# Patient Record
Sex: FEMALE | Race: WHITE | Employment: UNEMPLOYED | URBAN - METROPOLITAN AREA
[De-identification: names, ages, dates, MRNs, and addresses within clinical notes are randomized per-mention and may not be internally consistent; named-entity substitution may affect disease eponyms.]

---

## 2017-05-02 RX ORDER — ZOLPIDEM TARTRATE 5 MG/1
TABLET ORAL
Qty: 30 TAB | Refills: 1 | OUTPATIENT
Start: 2017-05-02 | End: 2018-04-23 | Stop reason: SDUPTHER

## 2017-06-14 ENCOUNTER — TELEPHONE (OUTPATIENT)
Dept: INTERNAL MEDICINE CLINIC | Age: 52
End: 2017-06-14

## 2017-06-14 NOTE — TELEPHONE ENCOUNTER
Spoke with pt calling with c/o palpitations on exertion four times in the past 3 months. It is only with exertion on left side and then sometimes pain down arm. Sx stop when she stops whatever she is doing. Offered appt for today, and pt is not interested and is going out of town and not returning until [facility] and is having construction at her house as well. Only wants to see [facility]J. Appt made for eval with [facility]J on 6/21. Red flags reviewed to warrant ED visit. Asked her to please have labs done that were ordered at last visit 10/16. Pt verbalized complete understanding.

## 2017-06-21 ENCOUNTER — OFFICE VISIT (OUTPATIENT)
Dept: INTERNAL MEDICINE CLINIC | Age: 52
End: 2017-06-21

## 2017-06-21 VITALS
RESPIRATION RATE: 16 BRPM | HEIGHT: 63 IN | HEART RATE: 98 BPM | SYSTOLIC BLOOD PRESSURE: 102 MMHG | OXYGEN SATURATION: 97 % | DIASTOLIC BLOOD PRESSURE: 64 MMHG | BODY MASS INDEX: 21.26 KG/M2 | WEIGHT: 120 LBS | TEMPERATURE: 98.7 F

## 2017-06-21 DIAGNOSIS — R07.89 OTHER CHEST PAIN: ICD-10-CM

## 2017-06-21 DIAGNOSIS — M25.512 ACUTE PAIN OF LEFT SHOULDER: Primary | ICD-10-CM

## 2017-06-21 DIAGNOSIS — E78.2 MIXED HYPERLIPIDEMIA: ICD-10-CM

## 2017-06-21 DIAGNOSIS — E03.9 UNSPECIFIED HYPOTHYROIDISM: ICD-10-CM

## 2017-06-21 DIAGNOSIS — R00.2 PALPITATIONS: ICD-10-CM

## 2017-06-21 DIAGNOSIS — R31.9 HEMATURIA: ICD-10-CM

## 2017-06-21 RX ORDER — TRIAMCINOLONE ACETONIDE 1 MG/G
PASTE DENTAL 2 TIMES DAILY
Qty: 5 G | Refills: 1 | Status: SHIPPED | OUTPATIENT
Start: 2017-06-21 | End: 2019-07-31 | Stop reason: SDUPTHER

## 2017-06-21 RX ORDER — TRIAMCINOLONE ACETONIDE 1 MG/G
PASTE DENTAL 2 TIMES DAILY
COMMUNITY
End: 2017-06-21 | Stop reason: SDUPTHER

## 2017-06-21 NOTE — PROGRESS NOTES
Chief Complaint   Patient presents with    Palpitations     x 4 months     Mouth Lesions    Shoulder Pain     left intermittent      EKG findings:  Sinus  Rhythm   WITHIN NORMAL LIMITS  Reviewed by Dr. Cam Irving III.

## 2017-06-21 NOTE — MR AVS SNAPSHOT
Visit Information Date & Time Provider Department Dept. Phone Encounter #  
 6/21/2017  3:00 PM Jamila Titus MD [facility] Internal Medicine [phone] [id number] Upcoming Health Maintenance Date Due COLONOSCOPY 5/26/1982 INFLUENZA AGE 9 TO ADULT 8/1/2017 BREAST CANCER SCRN MAMMOGRAM 4/14/2018 PAP AKA CERVICAL CYTOLOGY 1/24/2019 DTaP/Tdap/Td series (2 - Td) 10/19/2026 Allergies as of 6/21/2017  Review Complete On: 6/21/2017 By: Sumeet Dorantes LPN No Known Allergies Current Immunizations  Reviewed on 12/4/2013 Name Date Influenza Vaccine (Quad) PF 10/19/2016 Influenza Vaccine Split 11/28/2012 Tdap 10/19/2016 Not reviewed this visit You Were Diagnosed With   
  
 Codes Comments Acute pain of left shoulder    -  Primary ICD-10-CM: M25.512 ICD-9-CM: 719.41 Palpitations     ICD-10-CM: R00.2 ICD-9-CM: 785.1 Hematuria     ICD-10-CM: R31.9 ICD-9-CM: 599.70 Unspecified hypothyroidism     ICD-10-CM: E03.9 ICD-9-CM: 244.9 Mixed hyperlipidemia     ICD-10-CM: E78.2 ICD-9-CM: 272.2 Other chest pain     ICD-10-CM: R07.89 ICD-9-CM: 786.59 Vitals BP Pulse Temp Resp Height(growth percentile) Weight(growth percentile) 102/64 98 98.7 °F (37.1 °C) (Oral) 16 5' 3\" (1.6 m) 120 lb (54.4 kg) SpO2 BMI OB Status Smoking Status 97% 21.26 kg/m2 Having regular periods Never Smoker Vitals History BMI and BSA Data Body Mass Index Body Surface Area  
 21.26 kg/m 2 1.56 m 2 Preferred Pharmacy Pharmacy Name Phone [id number] [address] [facility] [phone] Your Updated Medication List  
  
   
This list is accurate as of: 6/21/17  3:53 PM.  Always use your most recent med list.  
  
  
  
  
 ALPRAZolam 0.25 mg tablet Commonly known as:  XANAX  
TAKE 1 TABLET BY MOUTH TWICE A DAY AS NEEDED  
  
 ARMOUR THYROID 30 mg tablet Generic drug:  thyroid (Pork) 1 1/2 in AM & 1 @ night  
  
 citalopram 20 mg tablet Commonly known as:  CELEXA  
TAKE 1 & 1/2 TABLETS BY MOUTH DAILY. fluticasone 50 mcg/actuation nasal spray Commonly known as:  Saurabh Peña INSTILL 2 SPRAYS IN EACH NOSTRIL DAILY  
  
 metaxalone 800 mg tablet Commonly known as:  SKELAXIN Take 0.5 Tabs by mouth three (3) times daily as needed for Pain.  
  
 triamcinolone acetonide 0.1 % dental paste Commonly known as:  KENALOG  
by Dental route two (2) times a day. zolpidem 5 mg tablet Commonly known as:  AMBIEN  
TAKE 1/2 TO 1 TABLET BY MOUTH NIGHTLY AS NEEDED FOR SLEEP Prescriptions Sent to Pharmacy Refills  
 triamcinolone acetonide (KENALOG) 0.1 % dental paste 1 Sig: by Dental route two (2) times a day. Class: Normal  
 Pharmacy: [address] [place] [address] #: [phone] Route: Dental  
  
We Performed the Following AMB POC EKG ROUTINE W/ 12 LEADS, INTER & REP [53911 CPT(R)] UA/M W/RFLX CULTURE, ROUTINE [[id number] Custom] To-Do List   
 06/21/2017 ECHO:  ECHO TTE STRESS EXRCSE COMP W OR WO CONTR Introducing [facility]! Dear Arlette Prior: Thank you for requesting a Wengo account. Our records indicate that you already have an active Wengo account. You can access your account anytime at [email or web address] Did you know that you can access your hospital and ER discharge instructions at any time in Wengo? You can also review all of your test results from your hospital stay or ER visit. Additional Information If you have questions, please visit the Frequently Asked Questions section of the Wengo website at [email or web address] Remember, Wengo is NOT to be used for urgent needs. For medical emergencies, dial 911. Now available from your iPhone and Android! Please provide this summary of care documentation to your next provider. Your primary care clinician is listed as [phone] If you have any questions after today's visit, please call [phone].

## 2017-06-22 LAB
APPEARANCE UR: CLEAR
BACTERIA #/AREA URNS HPF: NORMAL /[HPF]
BILIRUB UR QL STRIP: NEGATIVE
CASTS URNS QL MICRO: NORMAL /LPF
COLOR UR: YELLOW
EPI CELLS #/AREA URNS HPF: NORMAL /HPF
GLUCOSE UR QL: NEGATIVE
HGB UR QL STRIP: ABNORMAL
KETONES UR QL STRIP: NEGATIVE
LEUKOCYTE ESTERASE UR QL STRIP: NEGATIVE
MICRO URNS: ABNORMAL
MUCOUS THREADS URNS QL MICRO: PRESENT
NITRITE UR QL STRIP: NEGATIVE
PH UR STRIP: 7 [PH] (ref 5–7.5)
PROT UR QL STRIP: NEGATIVE
RBC #/AREA URNS HPF: NORMAL /HPF
SP GR UR: 1.02 (ref 1–1.03)
URINALYSIS REFLEX, 377202: ABNORMAL
UROBILINOGEN UR STRIP-MCNC: 0.2 MG/DL (ref 0.2–1)
WBC #/AREA URNS HPF: NORMAL /HPF

## 2017-06-22 NOTE — PROGRESS NOTES
HPI:  Michael Min is a 47y.o. year old female who is here for several issues. Had recent labs with free T4 that was low but she had missed several doses of her meds. Recent labs by a DO were fine for thyroid. Also her urine showed red and white cells with no symptoms. Her cholesterol was up but her HDL was 91. Over the last few months, several episodes of left shoulder pain that goes to the neck with exertion. At times it is walking up a hill and at others with lunging at the dog. Rest resolves it after a few minutes. Some BURCH but no nausea or sweats. No PND or orthopnea. No change In bowels or bladder. No fever or chills. No reflux. Past Medical History:   Diagnosis Date    Allergic rhinitis     Cancer ([facility] [address]     skin    Hypothyroid     Unspecified hypothyroidism 12/6/2010       Past Surgical History:   Procedure Laterality Date    BIOPSY OF BREAST, NEEDLE CORE      times two    HX BREAST BIOPSY Left 2006    Excisional biopsy revealing ADH    HX PELVIC LAPAROSCOPY      HX WISDOM TEETH EXTRACTION      ORAL SURGERY PROCEDURE         Prior to Admission medications    Medication Sig Start Date End Date Taking? Authorizing Provider   triamcinolone acetonide (KENALOG) 0.1 % dental paste by Dental route two (2) times a day. 6/21/17  Yes Emerson Whittington III, MD   zolpidem (AMBIEN) 5 mg tablet TAKE 1/2 TO 1 TABLET BY MOUTH NIGHTLY AS NEEDED FOR SLEEP 5/2/17  Yes Emerson Whittington III, MD   citalopram (CELEXA) 20 mg tablet TAKE 1 & 1/2 TABLETS BY MOUTH DAILY.  10/19/16  Yes Giorgio King MD   ALPRAZolam Kourtney Bowen) 0.25 mg tablet TAKE 1 TABLET BY MOUTH TWICE A DAY AS NEEDED 10/19/16  Yes Giorgio King MD   ARMOUR THYROID 30 mg tablet 1 1/2 in AM & 1 @ night  1/17/16  Yes Historical Provider   fluticasone (FLONASE) 50 mcg/actuation nasal spray INSTILL 2 SPRAYS IN EACH NOSTRIL DAILY 2/25/15  Yes Giorgio King MD   metaxalone (SKELAXIN) 800 mg tablet Take 0.5 Tabs by mouth three (3) times daily as needed for Pain. 12/4/13  Yes Catarina Muir MD       Social History     Social History    Marital status:      Spouse name: N/A    Number of children: N/A    Years of education: N/A     Occupational History    Not on file. Social History Main Topics    Smoking status: Never Smoker    Smokeless tobacco: Never Used    Alcohol use 0.0 oz/week     2 - 4 Glasses of wine per week    Drug use: No    Sexual activity: Yes     Partners: Male     Other Topics Concern    Not on file     Social History Narrative      Family History   Problem Relation Age of Onset    Thyroid Disease Mother      hypothyroid    Heart Disease Mother     Dementia Mother     Diabetes Father      small cell lung cancer    No Known Problems Brother     Heart Disease Paternal Grandmother          ROS  Per HPI    Visit Vitals    /64    Pulse 98    Temp 98.7 °F (37.1 °C) (Oral)    Resp 16    Ht 5' 3\" (1.6 m)    Wt 120 lb (54.4 kg)    SpO2 97%    BMI 21.26 kg/m2         Physical Exam   Physical Examination: General appearance - alert, well appearing, and in no distress  Mouth - mucous membranes moist, pharynx normal without lesions  Neck - supple, no significant adenopathy  Lymphatics - no palpable lymphadenopathy, no hepatosplenomegaly  Chest - clear to auscultation, no wheezes, rales or rhonchi, symmetric air entry  Heart - normal rate, regular rhythm, normal S1, S2, no murmurs, rubs, clicks or gallops  Abdomen - soft, nontender, nondistended, no masses or organomegaly  Neurological - alert, oriented, normal speech, no focal findings or movement disorder noted  Musculoskeletal - no joint tenderness, deformity or swelling  Extremities - peripheral pulses normal, no pedal edema, no clubbing or cyanosis    EKG: normal EKG, normal sinus rhythm, unchanged from previous tracings    Catarina Muir MD      Assessment/Plan:  Ha Carter was seen today for palpitations, mouth lesions and shoulder pain.     Diagnoses and all orders for this visit:    Acute pain of left shoulder - concerning for exertional angina and positive family history as well as menopause and high cholesterol - despite high HDL. Will get stress test and consider coronary calcium score if negative and statins.   -     AMB POC EKG ROUTINE W/ 12 LEADS, INTER & REP    Palpitations  -     AMB POC EKG ROUTINE W/ 12 LEADS, INTER & REP  -     ECHO TTE STRESS EXRCSE COMP W OR WO CONTR; Future    Hematuria - negative culture/. If it is repeated, will get urology to see for cysto and possible interstitial cystitis  -     UA/M W/RFLX CULTURE, ROUTINE    Unspecified hypothyroidism - despite the low free t4, will continue same meds and repeat levels in 3-6 months due to missed doses. Mixed hyperlipidemia - diet now. Other chest pain  -     ECHO TTE STRESS EXRCSE COMP W OR WO CONTR; Future    Other orders  -     triamcinolone acetonide (KENALOG) 0.1 % dental paste; by Dental route two (2) times a day. Follow-up Disposition: after stress test.        Advised her to call back or return to office if symptoms worsen/change/persist.  Discussed expected course/resolution/complications of diagnosis in detail with patient. Medication risks/benefits/costs/interactions/alternatives discussed with patient. She was given an after visit summary which includes diagnoses, current medications, & vitals. She expressed understanding with the diagnosis and plan.

## 2017-07-14 ENCOUNTER — CLINICAL SUPPORT (OUTPATIENT)
Dept: CARDIOLOGY CLINIC | Age: 52
End: 2017-07-14

## 2017-07-14 DIAGNOSIS — R07.89 OTHER CHEST PAIN: ICD-10-CM

## 2017-07-14 DIAGNOSIS — R00.2 PALPITATIONS: ICD-10-CM

## 2017-09-27 ENCOUNTER — OFFICE VISIT (OUTPATIENT)
Dept: SURGERY | Age: 52
End: 2017-09-27

## 2017-09-27 VITALS
BODY MASS INDEX: 22.15 KG/M2 | WEIGHT: 125 LBS | HEART RATE: 77 BPM | DIASTOLIC BLOOD PRESSURE: 75 MMHG | SYSTOLIC BLOOD PRESSURE: 119 MMHG | HEIGHT: 63 IN

## 2017-09-27 DIAGNOSIS — N60.42 DUCT ECTASIA OF BREAST, LEFT: Primary | ICD-10-CM

## 2017-09-27 DIAGNOSIS — Z91.89 AT RISK FOR BREAST CANCER: ICD-10-CM

## 2017-09-27 NOTE — PROGRESS NOTES
HISTORY OF PRESENT ILLNESS  Hawa Dill is a 47 y.o. female. HPI  ESTABLISHED patient here for LEFT breast pain and cord feeling to her LEFT breast.  3 weeks ago, her LEFT breast started hurting. The pain is below her nipple and lower breast. It is a 3/10. The breast was pinched or mashed so she thought the pain was from that. It hasn't gone away and she feels a cord like lump at the area of the pain. Denies nipple discharge/retraction. No skin changes. History of excisional biopsy for LEFT breast ADH in 2009.     Last mammogram done 4/2016, BIRADS 1  Breast MRI, 4/2016, BIRADS 2 to LEFT breast,  BIRADS 1 to RIGHT breast    Past Medical History:   Diagnosis Date    Allergic rhinitis     Cancer ([facility] [address]     skin    Hypothyroid     Unspecified hypothyroidism 12/6/2010       Past Surgical History:   Procedure Laterality Date    BIOPSY OF BREAST, NEEDLE CORE      times two    HX BREAST BIOPSY Left 2006    Excisional biopsy revealing ADH    HX PELVIC LAPAROSCOPY      HX WISDOM TEETH EXTRACTION      ORAL SURGERY PROCEDURE         Social History     Social History    Marital status:      Spouse name: N/A    Number of children: N/A    Years of education: N/A     Occupational History    Not on file. Social History Main Topics    Smoking status: Never Smoker    Smokeless tobacco: Never Used    Alcohol use 0.0 oz/week     2 - 4 Glasses of wine per week    Drug use: No    Sexual activity: Yes     Partners: Male     Other Topics Concern    Not on file     Social History Narrative       Current Outpatient Prescriptions on File Prior to Visit   Medication Sig Dispense Refill    zolpidem (AMBIEN) 5 mg tablet TAKE 1/2 TO 1 TABLET BY MOUTH NIGHTLY AS NEEDED FOR SLEEP 30 Tab 1    citalopram (CELEXA) 20 mg tablet TAKE 1 & 1/2 TABLETS BY MOUTH DAILY.  45 Tab 11    ARMOUR THYROID 30 mg tablet 1 1/2 in AM & 1 @ night   0    fluticasone (FLONASE) 50 mcg/actuation nasal spray INSTILL 2 SPRAYS IN EACH NOSTRIL DAILY 3 Bottle 3    triamcinolone acetonide (KENALOG) 0.1 % dental paste by Dental route two (2) times a day. 5 g 1    ALPRAZolam (XANAX) 0.25 mg tablet TAKE 1 TABLET BY MOUTH TWICE A DAY AS NEEDED 60 Tab 1    metaxalone (SKELAXIN) 800 mg tablet Take 0.5 Tabs by mouth three (3) times daily as needed for Pain. 40 Tab 0     No current facility-administered medications on file prior to visit. No Known Allergies    OB History     No data available          ROS  Constitutional: Negative    HENT: Negative. Eyes: Negative. Respiratory: Negative. Cardiovascular: Negative. Gastrointestinal: Negative. Genitourinary: Negative. Musculoskeletal: Negative. Skin: Negative. Neurological: Negative. Endo/Heme/Allergies: Negative. Psychiatric/Behavioral: Negative. Physical Exam   Cardiovascular: Normal rate and normal heart sounds. Pulmonary/Chest: Breath sounds normal. Right breast exhibits no inverted nipple, no mass, no nipple discharge, no skin change and no tenderness. Left breast exhibits skin change and tenderness. Left breast exhibits no inverted nipple, no mass and no nipple discharge. Breasts are symmetrical.       Lymphadenopathy:        Right cervical: No superficial cervical, no deep cervical and no posterior cervical adenopathy present. Left cervical: No superficial cervical, no deep cervical and no posterior cervical adenopathy present. Right axillary: No pectoral and no lateral adenopathy present. Left axillary: No pectoral and no lateral adenopathy present. BREAST ULTRASOUND  Indication: Left  breast mass 6:00  Technique: The area was scanned using a high-frequency linear-array near-field transducer  Findings: No abnormal mass, lesion, or shadowing noted. No cysts  Impression: Normal breast tissue with duct ectasia   Disposition: No worrisome finding on ultrasound    ASSESSMENT and PLAN    ICD-10-CM ICD-9-CM    1.  Duct ectasia of breast, left N60.42 610.4 MRI BREAST BI W WO CONT   2. At risk for breast cancer Z91.89 V49.89      Pt presents with LEFT breast pain from trauma x3 weeks, likely related to hematoma. Last year's imaging looks good. Unremarkable exam and US today. Will order annual BL MRI and mammogram. This plan was reviewed with the patient and patient agrees. All questions were answered.     Written by Erendira Abad, as dictated by Dr. Celestine Shannon MD.

## 2017-09-27 NOTE — COMMUNICATION BODY
HISTORY OF PRESENT ILLNESS  Sandra Lantigua is a 47 y.o. female. HPI  ESTABLISHED patient here for LEFT breast pain and cord feeling to her LEFT breast.  3 weeks ago, her LEFT breast started hurting. The pain is below her nipple and lower breast. It is a 3/10. The breast was pinched or mashed so she thought the pain was from that. It hasn't gone away and she feels a cord like lump at the area of the pain. Denies nipple discharge/retraction. No skin changes. History of excisional biopsy for LEFT breast ADH in 2009.     Last mammogram done 4/2016, BIRADS 1  Breast MRI, 4/2016, BIRADS 2 to LEFT breast,  BIRADS 1 to RIGHT breast    Past Medical History:   Diagnosis Date    Allergic rhinitis     Cancer ([facility] [address]     skin    Hypothyroid     Unspecified hypothyroidism 12/6/2010       Past Surgical History:   Procedure Laterality Date    BIOPSY OF BREAST, NEEDLE CORE      times two    HX BREAST BIOPSY Left 2006    Excisional biopsy revealing ADH    HX PELVIC LAPAROSCOPY      HX WISDOM TEETH EXTRACTION      ORAL SURGERY PROCEDURE         Social History     Social History    Marital status:      Spouse name: N/A    Number of children: N/A    Years of education: N/A     Occupational History    Not on file. Social History Main Topics    Smoking status: Never Smoker    Smokeless tobacco: Never Used    Alcohol use 0.0 oz/week     2 - 4 Glasses of wine per week    Drug use: No    Sexual activity: Yes     Partners: Male     Other Topics Concern    Not on file     Social History Narrative       Current Outpatient Prescriptions on File Prior to Visit   Medication Sig Dispense Refill    zolpidem (AMBIEN) 5 mg tablet TAKE 1/2 TO 1 TABLET BY MOUTH NIGHTLY AS NEEDED FOR SLEEP 30 Tab 1    citalopram (CELEXA) 20 mg tablet TAKE 1 & 1/2 TABLETS BY MOUTH DAILY.  45 Tab 11    ARMOUR THYROID 30 mg tablet 1 1/2 in AM & 1 @ night   0    fluticasone (FLONASE) 50 mcg/actuation nasal spray INSTILL 2 SPRAYS IN EACH NOSTRIL DAILY 3 Bottle 3    triamcinolone acetonide (KENALOG) 0.1 % dental paste by Dental route two (2) times a day. 5 g 1    ALPRAZolam (XANAX) 0.25 mg tablet TAKE 1 TABLET BY MOUTH TWICE A DAY AS NEEDED 60 Tab 1    metaxalone (SKELAXIN) 800 mg tablet Take 0.5 Tabs by mouth three (3) times daily as needed for Pain. 40 Tab 0     No current facility-administered medications on file prior to visit. No Known Allergies    OB History     No data available          ROS  Constitutional: Negative    HENT: Negative. Eyes: Negative. Respiratory: Negative. Cardiovascular: Negative. Gastrointestinal: Negative. Genitourinary: Negative. Musculoskeletal: Negative. Skin: Negative. Neurological: Negative. Endo/Heme/Allergies: Negative. Psychiatric/Behavioral: Negative. Physical Exam   Cardiovascular: Normal rate and normal heart sounds. Pulmonary/Chest: Breath sounds normal. Right breast exhibits no inverted nipple, no mass, no nipple discharge, no skin change and no tenderness. Left breast exhibits skin change and tenderness. Left breast exhibits no inverted nipple, no mass and no nipple discharge. Breasts are symmetrical.       Lymphadenopathy:        Right cervical: No superficial cervical, no deep cervical and no posterior cervical adenopathy present. Left cervical: No superficial cervical, no deep cervical and no posterior cervical adenopathy present. Right axillary: No pectoral and no lateral adenopathy present. Left axillary: No pectoral and no lateral adenopathy present. BREAST ULTRASOUND  Indication: Left  breast mass 6:00  Technique: The area was scanned using a high-frequency linear-array near-field transducer  Findings: No abnormal mass, lesion, or shadowing noted. No cysts  Impression: Normal breast tissue with duct ectasia   Disposition: No worrisome finding on ultrasound    ASSESSMENT and PLAN    ICD-10-CM ICD-9-CM    1.  Duct ectasia of breast, left N60.42 610.4 MRI BREAST BI W WO CONT   2. At risk for breast cancer Z91.89 V49.89      Pt presents with LEFT breast pain from trauma x3 weeks, likely related to hematoma. Last year's imaging looks good. Unremarkable exam and US today. Will order annual BL MRI and mammogram. This plan was reviewed with the patient and patient agrees. All questions were answered.     Written by Jacqueline Mcgrath, as dictated by Dr. Ericka Mayer MD.

## 2017-09-27 NOTE — MR AVS SNAPSHOT
Visit Information Date & Time Provider Department Dept. Phone Encounter #  
 9/27/2017  0:59 PM Hazel Dang MD [address] at [facility] [phone] Upcoming Health Maintenance Date Due COLONOSCOPY 5/26/1982 INFLUENZA AGE 9 TO ADULT 8/1/2017 BREAST CANCER SCRN MAMMOGRAM 4/14/2018 PAP AKA CERVICAL CYTOLOGY 1/24/2019 DTaP/Tdap/Td series (2 - Td) 10/19/2026 Allergies as of 9/27/2017  Review Complete On: 9/27/2017 By: Laz Calderon RN No Known Allergies Current Immunizations  Reviewed on 12/4/2013 Name Date Influenza Vaccine (Quad) PF 10/19/2016 Influenza Vaccine Split 11/28/2012 Tdap 10/19/2016 Not reviewed this visit You Were Diagnosed With   
  
 Codes Comments Duct ectasia of breast, left    -  Primary ICD-10-CM: N60.42 
ICD-9-CM: 610.4 Vitals BP Pulse Height(growth percentile) Weight(growth percentile) BMI  
 119/75 77 5' 3\" (1.6 m) 125 lb (56.7 kg) 22.14 kg/m2 OB Status Smoking Status Menopause Never Smoker Vitals History BMI and BSA Data Body Mass Index Body Surface Area  
 22.14 kg/m 2 1.59 m 2 Preferred Pharmacy Pharmacy Name Phone [id number] [phone] Your Updated Medication List  
  
   
This list is accurate as of: 9/27/17  3:12 PM.  Always use your most recent med list.  
  
  
  
  
 ALPRAZolam 0.25 mg tablet Commonly known as:  XANAX  
TAKE 1 TABLET BY MOUTH TWICE A DAY AS NEEDED  
  
 ARMOUR THYROID 30 mg tablet Generic drug:  thyroid (Pork) 1 1/2 in AM & 1 @ night  
  
 citalopram 20 mg tablet Commonly known as:  CELEXA  
TAKE 1 & 1/2 TABLETS BY MOUTH DAILY. fluticasone 50 mcg/actuation nasal spray Commonly known as:  Eveline Knack INSTILL 2 SPRAYS IN EACH NOSTRIL DAILY  
  
 metaxalone 800 mg tablet Commonly known as:  SKELAXIN  
 Take 0.5 Tabs by mouth three (3) times daily as needed for Pain.  
  
 triamcinolone acetonide 0.1 % dental paste Commonly known as:  KENALOG  
by Dental route two (2) times a day. zolpidem 5 mg tablet Commonly known as:  AMBIEN  
TAKE 1/2 TO 1 TABLET BY MOUTH NIGHTLY AS NEEDED FOR SLEEP Patient Instructions Breast Lumps (Noncancerous): Care Instructions Your Care Instructions Breast lumps or changes are a common health worry for most women. Women may have many kinds of breast lumps and other breast changes throughout their lives, including ones that occur with menstrual periods, pregnancy, and aging. Most breast lumps are harmless and are not cancer. Many womens breasts feel lumpy and tender before their menstrual periods. Women also may have lumps when they are breastfeeding. Breast lumps may go away after menopause. Common noncancerous breast lumps include: · Cysts, or sacs filled with fluid. · Skin cysts in the breast area. · Fatty lumps, which may be firm. These may or may not cause pain. · Fibroadenomas, growths that are round and firm with smooth edges. · Abscesses, which are pockets of infection within the breast. 
Follow-up care is a key part of your treatment and safety. Be sure to make and go to all appointments, and call your doctor if you are having problems. It's also a good idea to know your test results and keep a list of the medicines you take. How can you care for yourself at home? · Take an over-the-counter pain medicine, such as acetaminophen (Tylenol), ibuprofen (Advil, Motrin), or naproxen (Aleve). Read and follow all instructions on the label. · Do not take two or more pain medicines at the same time unless the doctor told you to. Many pain medicines have acetaminophen, which is Tylenol. Too much acetaminophen (Tylenol) can be harmful. · If you are pregnant, do not take any medicine other than acetaminophen unless your doctor has told you to. · Wear a supportive bra, such as a sports bra or jog bra. · You may want to limit caffeine. Some women say that cutting back on caffeine reduces their breast tenderness. · A diet very low in fat (about 15% of daily diet) may reduce breast tenderness. Talk to your doctor about whether you should try a very low-fat diet. · A diet low in salt (sodium) also may reduce breast tenderness. When should you call for help? Watch closely for changes in your health, and be sure to contact your doctor if you: 
· Have a fever. · Have swelling, redness, or pain. · Have a new breast lump that does not go away with your menstrual cycle. · Notice that a breast lump has changed. Where can you learn more? Go to http://suzanne-john.info/. Enter 95 [id number] in the search box to learn more about \"Breast Lumps (Noncancerous): Care Instructions. \" Current as of: October 13, 2016 Content Version: 11.3 © 4373-2166 [employer] Care instructions adapted under license by [employer] (which disclaims liability or warranty for this information). If you have questions about a medical condition or this instruction, always ask your healthcare professional. [place] [address] any warranty or liability for your use of this information. Breast Lumps (Noncancerous): Care Instructions Your Care Instructions Breast lumps or changes are a common health worry for most women. Women may have many kinds of breast lumps and other breast changes throughout their lives, including ones that occur with menstrual periods, pregnancy, and aging. Most breast lumps are harmless and are not cancer. Many womens breasts feel lumpy and tender before their menstrual periods. Women also may have lumps when they are breastfeeding. Breast lumps may go away after menopause. Common noncancerous breast lumps include: · Cysts, or sacs filled with fluid. · Skin cysts in the breast area. · Fatty lumps, which may be firm. These may or may not cause pain. · Fibroadenomas, growths that are round and firm with smooth edges. · Abscesses, which are pockets of infection within the breast. 
Follow-up care is a key part of your treatment and safety. Be sure to make and go to all appointments, and call your doctor if you are having problems. It's also a good idea to know your test results and keep a list of the medicines you take. How can you care for yourself at home? · Take an over-the-counter pain medicine, such as acetaminophen (Tylenol), ibuprofen (Advil, Motrin), or naproxen (Aleve). Read and follow all instructions on the label. · Do not take two or more pain medicines at the same time unless the doctor told you to. Many pain medicines have acetaminophen, which is Tylenol. Too much acetaminophen (Tylenol) can be harmful. · If you are pregnant, do not take any medicine other than acetaminophen unless your doctor has told you to. · Wear a supportive bra, such as a sports bra or jog bra. · You may want to limit caffeine. Some women say that cutting back on caffeine reduces their breast tenderness. · A diet very low in fat (about 15% of daily diet) may reduce breast tenderness. Talk to your doctor about whether you should try a very low-fat diet. · A diet low in salt (sodium) also may reduce breast tenderness. When should you call for help? Watch closely for changes in your health, and be sure to contact your doctor if you: 
· Have a fever. · Have swelling, redness, or pain. · Have a new breast lump that does not go away with your menstrual cycle. · Notice that a breast lump has changed. Where can you learn more? Go to http://suzanne-john.info/. Enter 95 [id number] in the search box to learn more about \"Breast Lumps (Noncancerous): Care Instructions. \" Current as of: October 13, 2016 Content Version: 11.3 © 0857-1986 [employer].  Care instructions adapted under license by [address] (which disclaims liability or warranty for this information). If you have questions about a medical condition or this instruction, always ask your healthcare professional. [address] any warranty or liability for your use of this information. Introducing [facility]! Dear Scott Polanco: Thank you for requesting a DancingAnchovy account. Our records indicate that you already have an active DancingAnchovy account. You can access your account anytime at [email or web address] Did you know that you can access your hospital and ER discharge instructions at any time in DancingAnchovy? You can also review all of your test results from your hospital stay or ER visit. Additional Information If you have questions, please visit the Frequently Asked Questions section of the DancingAnchovy website at [email or web address] Remember, DancingAnchovy is NOT to be used for urgent needs. For medical emergencies, dial 911. Now available from your iPhone and Android! Please provide this summary of care documentation to your next provider. Your primary care clinician is listed as Shlomo [phone] If you have any questions after today's visit, please call [phone].

## 2017-09-27 NOTE — PATIENT INSTRUCTIONS
Breast Lumps (Noncancerous): Care Instructions  Your Care Instructions  Breast lumps or changes are a common health worry for most women. Women may have many kinds of breast lumps and other breast changes throughout their lives, including ones that occur with menstrual periods, pregnancy, and aging. Most breast lumps are harmless and are not cancer. Many womens breasts feel lumpy and tender before their menstrual periods. Women also may have lumps when they are breastfeeding. Breast lumps may go away after menopause. Common noncancerous breast lumps include:  · Cysts, or sacs filled with fluid. · Skin cysts in the breast area. · Fatty lumps, which may be firm. These may or may not cause pain. · Fibroadenomas, growths that are round and firm with smooth edges. · Abscesses, which are pockets of infection within the breast.  Follow-up care is a key part of your treatment and safety. Be sure to make and go to all appointments, and call your doctor if you are having problems. It's also a good idea to know your test results and keep a list of the medicines you take. How can you care for yourself at home? · Take an over-the-counter pain medicine, such as acetaminophen (Tylenol), ibuprofen (Advil, Motrin), or naproxen (Aleve). Read and follow all instructions on the label. · Do not take two or more pain medicines at the same time unless the doctor told you to. Many pain medicines have acetaminophen, which is Tylenol. Too much acetaminophen (Tylenol) can be harmful. · If you are pregnant, do not take any medicine other than acetaminophen unless your doctor has told you to. · Wear a supportive bra, such as a sports bra or jog bra. · You may want to limit caffeine. Some women say that cutting back on caffeine reduces their breast tenderness. · A diet very low in fat (about 15% of daily diet) may reduce breast tenderness. Talk to your doctor about whether you should try a very low-fat diet.   · A diet low in salt (sodium) also may reduce breast tenderness. When should you call for help? Watch closely for changes in your health, and be sure to contact your doctor if you:  · Have a fever. · Have swelling, redness, or pain. · Have a new breast lump that does not go away with your menstrual cycle. · Notice that a breast lump has changed. Where can you learn more? Go to http://suzanne-john.info/. Enter 95 [id number] in the search box to learn more about \"Breast Lumps (Noncancerous): Care Instructions. \"  Current as of: October 13, 2016  Content Version: 11.3  © 3088-1069 [employer]. Care instructions adapted under license by [employer] (which disclaims liability or warranty for this information). If you have questions about a medical condition or this instruction, always ask your healthcare professional. [place] [address] any warranty or liability for your use of this information. Breast Lumps (Noncancerous): Care Instructions  Your Care Instructions  Breast lumps or changes are a common health worry for most women. Women may have many kinds of breast lumps and other breast changes throughout their lives, including ones that occur with menstrual periods, pregnancy, and aging. Most breast lumps are harmless and are not cancer. Many womens breasts feel lumpy and tender before their menstrual periods. Women also may have lumps when they are breastfeeding. Breast lumps may go away after menopause. Common noncancerous breast lumps include:  · Cysts, or sacs filled with fluid. · Skin cysts in the breast area. · Fatty lumps, which may be firm. These may or may not cause pain. · Fibroadenomas, growths that are round and firm with smooth edges. · Abscesses, which are pockets of infection within the breast.  Follow-up care is a key part of your treatment and safety. Be sure to make and go to all appointments, and call your doctor if you are having problems.  It's also a good idea to know your test results and keep a list of the medicines you take. How can you care for yourself at home? · Take an over-the-counter pain medicine, such as acetaminophen (Tylenol), ibuprofen (Advil, Motrin), or naproxen (Aleve). Read and follow all instructions on the label. · Do not take two or more pain medicines at the same time unless the doctor told you to. Many pain medicines have acetaminophen, which is Tylenol. Too much acetaminophen (Tylenol) can be harmful. · If you are pregnant, do not take any medicine other than acetaminophen unless your doctor has told you to. · Wear a supportive bra, such as a sports bra or jog bra. · You may want to limit caffeine. Some women say that cutting back on caffeine reduces their breast tenderness. · A diet very low in fat (about 15% of daily diet) may reduce breast tenderness. Talk to your doctor about whether you should try a very low-fat diet. · A diet low in salt (sodium) also may reduce breast tenderness. When should you call for help? Watch closely for changes in your health, and be sure to contact your doctor if you:  · Have a fever. · Have swelling, redness, or pain. · Have a new breast lump that does not go away with your menstrual cycle. · Notice that a breast lump has changed. Where can you learn more? Go to http://suzanne-john.info/. Enter 95 [id number] in the search box to learn more about \"Breast Lumps (Noncancerous): Care Instructions. \"  Current as of: October 13, 2016  Content Version: 11.3  © 0181-9192 [employer]. Care instructions adapted under license by [employer] (which disclaims liability or warranty for this information). If you have questions about a medical condition or this instruction, always ask your healthcare professional. [address] any warranty or liability for your use of this information.

## 2017-09-27 NOTE — PROGRESS NOTES
HISTORY OF PRESENT ILLNESS  Tray Ferreira is a 47 y.o. female. HPI  ESTABLISHED patient here for LEFT breast pain and cord feeling to her LEFT breast.  3 weeks ago, her LEFT breast started hurting. The pain is below her nipple and lower breast. It is a 3/10. The breast was pinched or mashed so she thought the pain was from that. It hasn't gone away and she feels a cord like lump at the area of the pain. Denies nipple discharge/retraction. No skin changes. History of excisional biopsy for LEFT breast ADH in 2009.     Last mammogram done 4/2016, BIRADS 1  Breast MRI, 4/2016, BIRADS 2 to LEFT breast,  BIRADS 1 to RIGHT breast    ROS    Physical Exam    ASSESSMENT and PLAN  {ASSESSMENT/PLAN:14869}

## 2017-10-18 RX ORDER — CITALOPRAM 20 MG/1
30 TABLET, FILM COATED ORAL DAILY
Qty: 135 TAB | Refills: 1 | Status: SHIPPED | OUTPATIENT
Start: 2017-10-18 | End: 2018-05-09 | Stop reason: SDUPTHER

## 2017-10-18 NOTE — TELEPHONE ENCOUNTER
Orders Placed This Encounter    citalopram (CELEXA) 20 mg tablet     Sig: Take 1.5 Tabs by mouth daily. Dispense:  135 Tab     Refill:  1     The above medication refills were approved via verbal order by Dr. Олег Woods III.

## 2017-12-08 ENCOUNTER — HOSPITAL ENCOUNTER (OUTPATIENT)
Dept: MRI IMAGING | Age: 52
Discharge: HOME | End: 2017-12-08
Attending: SURGERY
Payer: COMMERCIAL

## 2017-12-08 VITALS — BODY MASS INDEX: 22.14 KG/M2 | WEIGHT: 125 LBS

## 2017-12-08 DIAGNOSIS — N60.42 DUCT ECTASIA OF BREAST, LEFT: ICD-10-CM

## 2017-12-08 PROCEDURE — 74011250636 HC RX REV CODE- 250/636: Performed by: SURGERY

## 2017-12-08 PROCEDURE — 77059 MRI BREAST BI W WO CONT: CPT

## 2017-12-08 PROCEDURE — A9585 GADOBUTROL INJECTION: HCPCS | Performed by: SURGERY

## 2017-12-08 RX ADMIN — GADOBUTROL 5.5 ML: 604.72 INJECTION INTRAVENOUS at 14:13

## 2017-12-11 ENCOUNTER — TELEPHONE (OUTPATIENT)
Dept: SURGERY | Age: 52
End: 2017-12-11

## 2018-04-23 ENCOUNTER — OFFICE VISIT (OUTPATIENT)
Dept: INTERNAL MEDICINE CLINIC | Age: 53
End: 2018-04-23

## 2018-04-23 VITALS
WEIGHT: 125 LBS | RESPIRATION RATE: 14 BRPM | TEMPERATURE: 98.7 F | BODY MASS INDEX: 22.15 KG/M2 | SYSTOLIC BLOOD PRESSURE: 112 MMHG | DIASTOLIC BLOOD PRESSURE: 84 MMHG | HEIGHT: 63 IN | HEART RATE: 79 BPM | OXYGEN SATURATION: 97 %

## 2018-04-23 DIAGNOSIS — Z12.11 COLON CANCER SCREENING: ICD-10-CM

## 2018-04-23 DIAGNOSIS — E55.9 VITAMIN D DEFICIENCY: ICD-10-CM

## 2018-04-23 DIAGNOSIS — Z00.00 ROUTINE GENERAL MEDICAL EXAMINATION AT A HEALTH CARE FACILITY: Primary | ICD-10-CM

## 2018-04-23 DIAGNOSIS — R79.82 ELEVATED C-REACTIVE PROTEIN (CRP): ICD-10-CM

## 2018-04-23 DIAGNOSIS — E03.9 HYPOTHYROIDISM, UNSPECIFIED TYPE: ICD-10-CM

## 2018-04-23 RX ORDER — ZOLPIDEM TARTRATE 5 MG/1
TABLET ORAL
Qty: 30 TAB | Refills: 1 | Status: SHIPPED | OUTPATIENT
Start: 2018-04-23 | End: 2019-02-15 | Stop reason: SDUPTHER

## 2018-04-23 NOTE — PROGRESS NOTES
Subjective:   47 y.o. female for Well Woman Check. Her gyne and breast care is done elsewhere by her Ob-Gyne physician. Patient Active Problem List    Diagnosis Date Noted    At risk for breast cancer 09/27/2017    Advance directive discussed with patient 10/19/2016    Breast cyst 03/25/2016    Duct ectasia of breast 03/25/2016    Atypical ductal hyperplasia of breast 01/15/2014    Hypothyroidism 12/06/2010     Current Outpatient Prescriptions   Medication Sig Dispense Refill    zolpidem (AMBIEN) 5 mg tablet TAKE 1/2 TO 1 TABLET BY MOUTH NIGHTLY AS NEEDED FOR SLEEP 30 Tab 1    varicella-zoster recombinant, PF, (SHINGRIX) 50 mcg/0.5 mL susr injection 0.5 mL by IntraMUSCular route once for 1 dose. 0.5 mL 1    citalopram (CELEXA) 20 mg tablet Take 1.5 Tabs by mouth daily. 135 Tab 1    triamcinolone acetonide (KENALOG) 0.1 % dental paste by Dental route two (2) times a day. 5 g 1    ALPRAZolam (XANAX) 0.25 mg tablet TAKE 1 TABLET BY MOUTH TWICE A DAY AS NEEDED 60 Tab 1    ARMOUR THYROID 30 mg tablet 1 1/2 in AM & 1 @ night   0    fluticasone (FLONASE) 50 mcg/actuation nasal spray INSTILL 2 SPRAYS IN EACH NOSTRIL DAILY 3 Bottle 3    metaxalone (SKELAXIN) 800 mg tablet Take 0.5 Tabs by mouth three (3) times daily as needed for Pain.  36 Tab 0     No Known Allergies  Past Medical History:   Diagnosis Date    Allergic rhinitis     Cancer ([address]     skin    Hypothyroid     Unspecified hypothyroidism 12/6/2010     Past Surgical History:   Procedure Laterality Date    HX BREAST BIOPSY Left 2006    Excisional biopsy revealing ADH    HX PELVIC LAPAROSCOPY      HX WISDOM TEETH EXTRACTION      ORAL SURGERY PROCEDURE      MO BIOPSY OF BREAST, NEEDLE CORE      times two     Family History   Problem Relation Age of Onset    Thyroid Disease Mother      hypothyroid    Heart Disease Mother     Dementia Mother     Diabetes Father      small cell lung cancer    No Known Problems Brother     Heart Disease Paternal Grandmother      Social History   Substance Use Topics    Smoking status: Never Smoker    Smokeless tobacco: Never Used    Alcohol use 0.0 oz/week     2 - 4 Glasses of wine per week        Lab Results   Component Value Date/Time    WBC 5.4 06/16/2017 09:44 AM    HGB 13.3 06/16/2017 09:44 AM    HCT 39.6 06/16/2017 09:44 AM    PLATELET 574 15/43/1402 09:44 AM    MCV 92 06/16/2017 09:44 AM     Lab Results   Component Value Date/Time    Cholesterol, total 241 (H) 06/16/2017 09:44 AM    HDL Cholesterol 91 06/16/2017 09:44 AM    LDL, calculated 137 (H) 06/16/2017 09:44 AM    Triglyceride 67 06/16/2017 09:44 AM    CHOL/HDL Ratio 2.9 12/09/2009 07:58 AM     Lab Results   Component Value Date/Time    ALT (SGPT) 19 06/16/2017 09:44 AM    AST (SGOT) 25 06/16/2017 09:44 AM    Alk. phosphatase 39 06/16/2017 09:44 AM    Bilirubin, total 0.5 06/16/2017 09:44 AM    Albumin 4.5 06/16/2017 09:44 AM    Protein, total 7.5 06/16/2017 09:44 AM    PLATELET 458 68/01/3479 09:44 AM       Lab Results   Component Value Date/Time    GFR est non-AA 99 06/16/2017 09:44 AM    GFR est  06/16/2017 09:44 AM    Creatinine 0.70 06/16/2017 09:44 AM    BUN 13 06/16/2017 09:44 AM    Sodium 138 06/16/2017 09:44 AM    Potassium 4.2 06/16/2017 09:44 AM    Chloride 97 06/16/2017 09:44 AM    CO2 25 06/16/2017 09:44 AM     Lab Results   Component Value Date/Time    TSH 0.852 06/16/2017 09:44 AM    T3,FREE 3.5 03/18/2010 10:21 AM    T4, Free 0.70 (L) 06/16/2017 09:44 AM      Lab Results   Component Value Date/Time    Glucose 82 06/16/2017 09:44 AM         Specific concerns today: None. Some ongoing fatigue since she has been working hard to sell her mom's house. Seeing endocrine MD for followup of thyroid. Labs were requested from him. .    Review of Systems  A comprehensive review of systems was negative except for that written in the HPI.     Objective:   Blood pressure 112/84, pulse 79, temperature 98.7 °F (37.1 °C), temperature source Oral, resp. rate 14, height 5' 3\" (1.6 m), weight 125 lb (56.7 kg), SpO2 97 %. Physical Examination:   General appearance - alert, well appearing, and in no distress  Eyes - pupils equal and reactive, extraocular eye movements intact  Ears - bilateral TM's and external ear canals normal  Nose - normal and patent, no erythema, discharge or polyps  Mouth - mucous membranes moist, pharynx normal without lesions  Neck - supple, no significant adenopathy  Lymphatics - no palpable lymphadenopathy, no hepatosplenomegaly  Chest - clear to auscultation, no wheezes, rales or rhonchi, symmetric air entry  Heart - normal rate, regular rhythm, normal S1, S2, no murmurs, rubs, clicks or gallops  Abdomen - soft, nontender, nondistended, no masses or organomegaly  Neurological - alert, oriented, normal speech, no focal findings or movement disorder noted  Musculoskeletal - no joint tenderness, deformity or swelling  Extremities - peripheral pulses normal, no pedal edema, no clubbing or cyanosis     Assessment/Plan:     continue present plan, routine labs ordered  Diagnoses and all orders for this visit:    1. Routine general medical examination at a health care facility  -     zolpidem (AMBIEN) 5 mg tablet; TAKE 1/2 TO 1 TABLET BY MOUTH NIGHTLY AS NEEDED FOR SLEEP  -     REFERRAL TO GASTROENTEROLOGY  -     CBC WITH AUTOMATED DIFF  -     METABOLIC PANEL, COMPREHENSIVE  -     LIPID PANEL  -     UA/M W/RFLX CULTURE, ROUTINE  -     TSH AND FREE T4  -     CRP, HIGH SENSITIVITY  -     VITAMIN D, 25 HYDROXY  -     T3, FREE    2. Colon cancer screening    3. Hypothyroidism, unspecified type -appears euthyroid. Will check levels and adjust meds. 4. Vitamin D deficiency -check to be sure this is still stable. 5. Elevated C-reactive protein (CRP)    Other orders  -     varicella-zoster recombinant, PF, (SHINGRIX) 50 mcg/0.5 mL susr injection; 0.5 mL by IntraMUSCular route once for 1 dose.   She will make an appointment with her gynecologist for follow-up Pap smear. Follow-up Disposition: 12 months and as needed   Advised her to call back or return to office if symptoms worsen/change/persist.  Discussed expected course/resolution/complications of diagnosis in detail with patient. Medication risks/benefits/costs/interactions/alternatives discussed with patient. She was given an after visit summary which includes diagnoses, current medications, & vitals. She expressed understanding with the diagnosis and plan.

## 2018-04-24 LAB
25(OH)D3+25(OH)D2 SERPL-MCNC: 36.6 NG/ML (ref 30–100)
ALBUMIN SERPL-MCNC: 4.4 G/DL (ref 3.5–5.5)
ALBUMIN/GLOB SERPL: 1.6 {RATIO} (ref 1.2–2.2)
ALP SERPL-CCNC: 41 IU/L (ref 39–117)
ALT SERPL-CCNC: 17 IU/L (ref 0–32)
APPEARANCE UR: CLEAR
AST SERPL-CCNC: 20 IU/L (ref 0–40)
BACTERIA #/AREA URNS HPF: NORMAL /[HPF]
BASOPHILS # BLD AUTO: 0 X10E3/UL (ref 0–0.2)
BASOPHILS NFR BLD AUTO: 0 %
BILIRUB SERPL-MCNC: 0.4 MG/DL (ref 0–1.2)
BILIRUB UR QL STRIP: NEGATIVE
BUN SERPL-MCNC: 11 MG/DL (ref 6–24)
BUN/CREAT SERPL: 13 (ref 9–23)
CALCIUM SERPL-MCNC: 9.3 MG/DL (ref 8.7–10.2)
CASTS URNS QL MICRO: NORMAL /LPF
CHLORIDE SERPL-SCNC: 101 MMOL/L (ref 96–106)
CHOLEST SERPL-MCNC: 236 MG/DL (ref 100–199)
CO2 SERPL-SCNC: 26 MMOL/L (ref 18–29)
COLOR UR: YELLOW
CREAT SERPL-MCNC: 0.84 MG/DL (ref 0.57–1)
CRP SERPL HS-MCNC: 1.25 MG/L (ref 0–3)
EOSINOPHIL # BLD AUTO: 0.1 X10E3/UL (ref 0–0.4)
EOSINOPHIL NFR BLD AUTO: 1 %
EPI CELLS #/AREA URNS HPF: NORMAL /HPF
ERYTHROCYTE [DISTWIDTH] IN BLOOD BY AUTOMATED COUNT: 14.2 % (ref 12.3–15.4)
GFR SERPLBLD CREATININE-BSD FMLA CKD-EPI: 80 ML/MIN/1.73
GFR SERPLBLD CREATININE-BSD FMLA CKD-EPI: 92 ML/MIN/1.73
GLOBULIN SER CALC-MCNC: 2.8 G/DL (ref 1.5–4.5)
GLUCOSE SERPL-MCNC: 84 MG/DL (ref 65–99)
GLUCOSE UR QL: NEGATIVE
HCT VFR BLD AUTO: 38.3 % (ref 34–46.6)
HDLC SERPL-MCNC: 89 MG/DL
HGB BLD-MCNC: 12.8 G/DL (ref 11.1–15.9)
HGB UR QL STRIP: ABNORMAL
IMM GRANULOCYTES # BLD: 0 X10E3/UL (ref 0–0.1)
IMM GRANULOCYTES NFR BLD: 0 %
KETONES UR QL STRIP: NEGATIVE
LDLC SERPL CALC-MCNC: 132 MG/DL (ref 0–99)
LEUKOCYTE ESTERASE UR QL STRIP: NEGATIVE
LYMPHOCYTES # BLD AUTO: 1.3 X10E3/UL (ref 0.7–3.1)
LYMPHOCYTES NFR BLD AUTO: 26 %
MCH RBC QN AUTO: 30.4 PG (ref 26.6–33)
MCHC RBC AUTO-ENTMCNC: 33.4 G/DL (ref 31.5–35.7)
MCV RBC AUTO: 91 FL (ref 79–97)
MICRO URNS: ABNORMAL
MONOCYTES # BLD AUTO: 0.5 X10E3/UL (ref 0.1–0.9)
MONOCYTES NFR BLD AUTO: 10 %
NEUTROPHILS # BLD AUTO: 3 X10E3/UL (ref 1.4–7)
NEUTROPHILS NFR BLD AUTO: 63 %
NITRITE UR QL STRIP: NEGATIVE
PH UR STRIP: 7.5 [PH] (ref 5–7.5)
PLATELET # BLD AUTO: 268 X10E3/UL (ref 150–379)
POTASSIUM SERPL-SCNC: 5.1 MMOL/L (ref 3.5–5.2)
PROT SERPL-MCNC: 7.2 G/DL (ref 6–8.5)
PROT UR QL STRIP: NEGATIVE
RBC # BLD AUTO: 4.21 X10E6/UL (ref 3.77–5.28)
RBC #/AREA URNS HPF: NORMAL /HPF
SODIUM SERPL-SCNC: 143 MMOL/L (ref 134–144)
SP GR UR: 1.01 (ref 1–1.03)
T3FREE SERPL-MCNC: 5 PG/ML (ref 2–4.4)
T4 FREE SERPL-MCNC: 0.88 NG/DL (ref 0.82–1.77)
TRIGL SERPL-MCNC: 74 MG/DL (ref 0–149)
TSH SERPL DL<=0.005 MIU/L-ACNC: 0.13 UIU/ML (ref 0.45–4.5)
URINALYSIS REFLEX, 377202: ABNORMAL
UROBILINOGEN UR STRIP-MCNC: 0.2 MG/DL (ref 0.2–1)
VLDLC SERPL CALC-MCNC: 15 MG/DL (ref 5–40)
WBC # BLD AUTO: 4.8 X10E3/UL (ref 3.4–10.8)
WBC #/AREA URNS HPF: NORMAL /HPF

## 2018-05-09 RX ORDER — CITALOPRAM 20 MG/1
30 TABLET, FILM COATED ORAL DAILY
Qty: 135 TAB | Refills: 1 | Status: SHIPPED | OUTPATIENT
Start: 2018-05-09 | End: 2018-11-12 | Stop reason: SDUPTHER

## 2018-05-09 NOTE — TELEPHONE ENCOUNTER
Orders Placed This Encounter    citalopram (CELEXA) 20 mg tablet     Sig: Take 1.5 Tabs by mouth daily. Dispense:  135 Tab     Refill:  1     The above medication refills were approved via verbal order by Dr. Leola Alcantar III.

## 2018-11-12 RX ORDER — CITALOPRAM 20 MG/1
TABLET, FILM COATED ORAL
Qty: 135 TAB | Refills: 1 | Status: SHIPPED | OUTPATIENT
Start: 2018-11-12 | End: 2019-05-12 | Stop reason: SDUPTHER

## 2019-02-15 DIAGNOSIS — Z00.00 ROUTINE GENERAL MEDICAL EXAMINATION AT A HEALTH CARE FACILITY: ICD-10-CM

## 2019-02-15 RX ORDER — ZOLPIDEM TARTRATE 5 MG/1
TABLET ORAL
Qty: 30 TAB | Refills: 1 | OUTPATIENT
Start: 2019-02-15 | End: 2019-07-31 | Stop reason: SDUPTHER

## 2019-02-15 NOTE — TELEPHONE ENCOUNTER
Orders Placed This Encounter    zolpidem (AMBIEN) 5 mg tablet     Sig: TAKE 1/2-1 TABLET BY MOUTH AT BEDTIME AS NEEDED FOR SLEEP     Dispense:  30 Tab     Refill:  1     This request is for a new prescription for a controlled substance as required by Federal/State law. .     The above controlled substance refill was called into patients pharmacy - [facility]/ - authorized by Dr. Deena Gray.

## 2019-04-26 RX ORDER — AMOXICILLIN AND CLAVULANATE POTASSIUM 875; 125 MG/1; MG/1
1 TABLET, FILM COATED ORAL EVERY 12 HOURS
Qty: 14 TAB | Refills: 0 | Status: SHIPPED | OUTPATIENT
Start: 2019-04-26 | End: 2019-07-31 | Stop reason: ALTCHOICE

## 2019-05-13 RX ORDER — CITALOPRAM 20 MG/1
TABLET, FILM COATED ORAL
Qty: 135 TAB | Refills: 1 | Status: SHIPPED | OUTPATIENT
Start: 2019-05-13 | End: 2019-11-22 | Stop reason: SDUPTHER

## 2019-07-31 ENCOUNTER — OFFICE VISIT (OUTPATIENT)
Dept: INTERNAL MEDICINE CLINIC | Age: 54
End: 2019-07-31

## 2019-07-31 VITALS
HEIGHT: 63 IN | HEART RATE: 80 BPM | BODY MASS INDEX: 22.32 KG/M2 | DIASTOLIC BLOOD PRESSURE: 72 MMHG | SYSTOLIC BLOOD PRESSURE: 109 MMHG | WEIGHT: 126 LBS | TEMPERATURE: 98.7 F | OXYGEN SATURATION: 96 % | RESPIRATION RATE: 18 BRPM

## 2019-07-31 DIAGNOSIS — Z00.00 ROUTINE GENERAL MEDICAL EXAMINATION AT A HEALTH CARE FACILITY: ICD-10-CM

## 2019-07-31 DIAGNOSIS — M77.52 LEFT ANKLE TENDONITIS: Primary | ICD-10-CM

## 2019-07-31 DIAGNOSIS — F51.01 PRIMARY INSOMNIA: ICD-10-CM

## 2019-07-31 RX ORDER — ZOLPIDEM TARTRATE 5 MG/1
TABLET ORAL
Qty: 30 TAB | Refills: 1 | Status: SHIPPED | OUTPATIENT
Start: 2019-07-31 | End: 2019-11-29 | Stop reason: SDUPTHER

## 2019-07-31 RX ORDER — METAXALONE 800 MG/1
400 TABLET ORAL
Qty: 40 TAB | Refills: 0 | Status: SHIPPED | OUTPATIENT
Start: 2019-07-31 | End: 2019-10-16

## 2019-07-31 RX ORDER — TRIAMCINOLONE ACETONIDE 1 MG/G
PASTE DENTAL 2 TIMES DAILY
Qty: 5 G | Refills: 1 | Status: SHIPPED | OUTPATIENT
Start: 2019-07-31

## 2019-07-31 NOTE — PATIENT INSTRUCTIONS
Ankle Sprain: Rehab Exercises  Introduction  Here are some examples of exercises for you to try. The exercises may be suggested for a condition or for rehabilitation. Start each exercise slowly. Ease off the exercises if you start to have pain. You will be told when to start these exercises and which ones will work best for you. How to do the exercises  \"Alphabet\" exercise    1. Trace the alphabet with your toe. This helps your ankle move in all directions. Side-to-side knee swing exercise    1. Sit in a chair with your foot flat on the floor. 2. Slowly move your knee from side to side. Keep your foot pressed flat. 3. Continue this exercise for 2 to 3 minutes. Towel curl    1. While sitting, place your foot on a towel on the floor. Scrunch the towel toward you with your toes. 2. Then use your toes to push the towel away from you. 3. To make this exercise more challenging you can put something on the other end of the towel. A can of soup is about the right weight for this. Towel stretch    1. Sit with your legs extended and knees straight. 2. Place a towel around your foot just under the toes. 3. Hold each end of the towel in each hand, with your hands above your knees. 4. Pull back with the towel so that your foot stretches toward you. 5. Hold the position for at least 15 to 30 seconds. 6. Repeat 2 to 4 times a session. Do up to 5 sessions a day. Ankle eversion exercise    1. Start by sitting with your foot flat on the floor. Push your foot outward against a wall or a piece of furniture that doesn't move. Hold for about 6 seconds, and relax. Repeat 8 to 12 times. 2. After you feel comfortable with this, try using rubber tubing looped around the outside of your feet for resistance. Push your foot out to the side against the tubing, and then count to 10 as you slowly bring your foot back to the middle. Repeat 8 to 12 times. Isometric opposition exercises    1.  While sitting, put your feet together flat on the floor. 2. Press your injured foot inward against your other foot. Hold for about 6 seconds, and relax. Repeat 8 to 12 times. 3. Then place the heel of your other foot on top of the injured one. Push down with the top heel while trying to push up with your injured foot. Hold for about 6 seconds, and relax. Repeat 8 to 12 times. Resisted ankle inversion    1. Sit on the floor with your good leg crossed over your other leg. 2. Hold both ends of an exercise band and loop the band around the inside of your affected foot. Then press your other foot against the band. 3. Keeping your legs crossed, slowly push your affected foot against the band so that foot moves away from your other foot. Then slowly relax. 4. Repeat 8 to 12 times. Resisted ankle eversion    1. Sit on the floor with your legs straight. 2. Hold both ends of an exercise band and loop the band around the outside of your affected foot. Then press your other foot against the band. 3. Keeping your leg straight, slowly push your affected foot outward against the band and away from your other foot without letting your leg rotate. Then slowly relax. 4. Repeat 8 to 12 times. Resisted ankle dorsiflexion    1. Tie the ends of an exercise band together to form a loop. Attach one end of the loop to a secure object or shut a door on it to hold it in place. (Or you can have someone hold one end of the loop to provide resistance.)  2. While sitting on the floor or in a chair, loop the other end of the band over the top of your affected foot. 3. Keeping your knee and leg straight, slowly flex your foot to pull back on the exercise band, and then slowly relax. 4. Repeat 8 to 12 times. Single-leg balance    1. Stand on a flat surface with your arms stretched out to your sides like you are making the letter \"T. \" Then lift your good leg off the floor, bending it at the knee.  If you are not steady on your feet, use one hand to hold on to a chair, counter, or wall. 2. Standing on the leg with your affected ankle, keep that knee straight. Try to balance on that leg for up to 30 seconds. Then rest for up to 10 seconds. 3. Repeat 6 to 8 times. 4. When you can balance on your affected leg for 30 seconds with your eyes open, try to balance on it with your eyes closed. 5. When you can do this exercise with your eyes closed for 30 seconds and with ease and no pain, try standing on a pillow or piece of foam, and repeat steps 1 through 4. Follow-up care is a key part of your treatment and safety. Be sure to make and go to all appointments, and call your doctor if you are having problems. It's also a good idea to know your test results and keep a list of the medicines you take. Where can you learn more? Go to http://suzanne-john.info/. Sweta Rivera in the search box to learn more about \"Ankle Sprain: Rehab Exercises. \"  Current as of: September 20, 2018  Content Version: 12.1  © 7126-2073 [employer]. Care instructions adapted under license by [employer] (which disclaims liability or warranty for this information). If you have questions about a medical condition or this instruction, always ask your healthcare professional. [address] any warranty or liability for your use of this information.

## 2019-08-01 NOTE — PROGRESS NOTES
HPI:  Osbaldo Quiroz is a 53y.o. year old female who is here for several issues. She is here to follow-up on her medications. She is been using Ambien as needed and it is helping her sleep. She is had a several month history of pain in both of her ankles. Seems to be after she is done a great deal of work on her feet and hurts most after she is been sitting for a while. No swelling or redness. She is been trying to use some ibuprofen as needed and also supports. She is not aware of any injury other than overuse. Past Medical History:   Diagnosis Date    Allergic rhinitis     Cancer ([facility] [address]     skin    Hypothyroid     Unspecified hypothyroidism 12/6/2010       Past Surgical History:   Procedure Laterality Date    HX BREAST BIOPSY Left 2006    Excisional biopsy revealing ADH    HX PELVIC LAPAROSCOPY      HX WISDOM TEETH EXTRACTION      TX BIOPSY OF BREAST, NEEDLE CORE      times two    TX UNS ORAL SURG PROC BY REPORT         Prior to Admission medications    Medication Sig Start Date End Date Taking? Authorizing Provider   zolpidem (AMBIEN) 5 mg tablet TAKE 1/2-1 TABLET BY MOUTH AT BEDTIME AS NEEDED FOR SLEEP 7/31/19  Yes John Jane III, MD   triamcinolone acetonide (KENALOG) 0.1 % dental paste by Dental route two (2) times a day. 7/31/19  Yes John Jane III, MD   metaxalone [facility]) 800 mg tablet Take 0.5 Tabs by mouth three (3) times daily as needed for Pain. 7/31/19  Yes Robson Mcclendon MD   varicella-zoster recombinant, PF, (SHINGRIX, PF,) 50 mcg/0.5 mL susr injection 0.5 mL by IntraMUSCular route once for 1 dose. 7/31/19 7/31/19 Yes John Jane III, MD   citalopram (CELEXA) 20 mg tablet TAKE 1 AND 1/2 TABS BY MOUTH DAILY.  5/13/19  Yes Robson Mcclendon MD   ALPRAZolam Emeka Hoskins) 0.25 mg tablet TAKE 1 TABLET BY MOUTH TWICE A DAY AS NEEDED 10/19/16  Yes Robson Mcclendon MD   ARMOUR THYROID 30 mg tablet 1 1/2 in AM & 1 @ night  1/17/16  Yes Provider, Historical fluticasone (FLONASE) 50 mcg/actuation nasal spray INSTILL 2 SPRAYS IN EACH NOSTRIL DAILY 2/25/15  Yes Óscar Martins MD       Social History     Socioeconomic History    Marital status:      Spouse name: Not on file    Number of children: Not on file    Years of education: Not on file    Highest education level: Not on file   Occupational History    Not on file   Social Needs    Financial resource strain: Not on file    Food insecurity:     Worry: Not on file     Inability: Not on file    Transportation needs:     Medical: Not on file     Non-medical: Not on file   Tobacco Use    Smoking status: Never Smoker    Smokeless tobacco: Never Used   Substance and Sexual Activity    Alcohol use:  Yes     Alcohol/week: 0.0 standard drinks     Types: 2 - 4 Glasses of wine per week    Drug use: No    Sexual activity: Yes     Partners: Male   Lifestyle    Physical activity:     Days per week: Not on file     Minutes per session: Not on file    Stress: Not on file   Relationships    Social connections:     Talks on phone: Not on file     Gets together: Not on file     Attends Jainism service: Not on file     Active member of club or organization: Not on file     Attends meetings of clubs or organizations: Not on file     Relationship status: Not on file    Intimate partner violence:     Fear of current or ex partner: Not on file     Emotionally abused: Not on file     Physically abused: Not on file     Forced sexual activity: Not on file   Other Topics Concern    Not on file   Social History Narrative    Not on file          ROS  Per HPI    Visit Vitals  /72   Pulse 80   Temp 98.7 °F (37.1 °C)   Resp 18   Ht 5' 3\" (1.6 m)   Wt 126 lb (57.2 kg)   SpO2 96%   BMI 22.32 kg/m²         Physical Exam   Physical Examination: General appearance - alert, well appearing, and in no distress  Chest - clear to auscultation, no wheezes, rales or rhonchi, symmetric air entry  Heart - normal rate, regular rhythm, normal S1, S2, no murmurs, rubs, clicks or gallops  Neurological - alert, oriented, normal speech, no focal findings or movement disorder noted  Musculoskeletal - abnormal exam of left ankle with tenderness over the medial malleolus and just below. No swelling or red ness. Normal ROM. Extremities - peripheral pulses normal, no pedal edema, no clubbing or cyanosis      Assessment/Plan:  Diagnoses and all orders for this visit:    1. Left ankle tendonitis -treat with stretches, ice, and a brace. Use ibuprofen as needed. Consider injection and physical therapy if the pain persist.    2. Routine general medical examination at a health care facility  -     zolpidem (AMBIEN) 5 mg tablet; TAKE 1/2-1 TABLET BY MOUTH AT BEDTIME AS NEEDED FOR SLEEP    3. Primary insomnia -continue to use the Ambien as needed. Other orders  -     triamcinolone acetonide (KENALOG) 0.1 % dental paste; by Dental route two (2) times a day. -     metaxalone (SKELAXIN) 800 mg tablet; Take 0.5 Tabs by mouth three (3) times daily as needed for Pain.  -     varicella-zoster recombinant, PF, (SHINGRIX, PF,) 50 mcg/0.5 mL susr injection; 0.5 mL by IntraMUSCular route once for 1 dose. Advised her to call back or return to office if symptoms worsen/change/persist.  Discussed expected course/resolution/complications of diagnosis in detail with patient. Medication risks/benefits/costs/interactions/alternatives discussed with patient. She was given an after visit summary which includes diagnoses, current medications, & vitals. She expressed understanding with the diagnosis and plan.

## 2019-10-16 ENCOUNTER — OFFICE VISIT (OUTPATIENT)
Dept: INTERNAL MEDICINE CLINIC | Age: 54
End: 2019-10-16

## 2019-10-16 ENCOUNTER — HOSPITAL ENCOUNTER (OUTPATIENT)
Dept: MAMMOGRAPHY | Age: 54
Discharge: HOME | End: 2019-10-16
Attending: SURGERY
Payer: COMMERCIAL

## 2019-10-16 ENCOUNTER — OFFICE VISIT (OUTPATIENT)
Dept: SURGERY | Age: 54
End: 2019-10-16

## 2019-10-16 VITALS
RESPIRATION RATE: 14 BRPM | HEIGHT: 63 IN | TEMPERATURE: 98.7 F | WEIGHT: 129 LBS | HEART RATE: 73 BPM | SYSTOLIC BLOOD PRESSURE: 125 MMHG | DIASTOLIC BLOOD PRESSURE: 73 MMHG | OXYGEN SATURATION: 98 % | BODY MASS INDEX: 22.86 KG/M2

## 2019-10-16 VITALS
WEIGHT: 125 LBS | HEIGHT: 63 IN | BODY MASS INDEX: 22.15 KG/M2 | HEART RATE: 78 BPM | DIASTOLIC BLOOD PRESSURE: 58 MMHG | SYSTOLIC BLOOD PRESSURE: 108 MMHG

## 2019-10-16 DIAGNOSIS — Z23 ENCOUNTER FOR IMMUNIZATION: ICD-10-CM

## 2019-10-16 DIAGNOSIS — Z12.39 BREAST CANCER SCREENING: ICD-10-CM

## 2019-10-16 DIAGNOSIS — N60.12 FIBROCYSTIC BREAST CHANGES, LEFT: ICD-10-CM

## 2019-10-16 DIAGNOSIS — Z12.39 BREAST CANCER SCREENING: Primary | ICD-10-CM

## 2019-10-16 DIAGNOSIS — Z00.00 ROUTINE GENERAL MEDICAL EXAMINATION AT A HEALTH CARE FACILITY: Primary | ICD-10-CM

## 2019-10-16 DIAGNOSIS — E03.9 HYPOTHYROIDISM, UNSPECIFIED TYPE: ICD-10-CM

## 2019-10-16 DIAGNOSIS — E55.9 VITAMIN D DEFICIENCY: ICD-10-CM

## 2019-10-16 DIAGNOSIS — R07.9 CHEST PAIN, UNSPECIFIED TYPE: ICD-10-CM

## 2019-10-16 PROCEDURE — 77063 BREAST TOMOSYNTHESIS BI: CPT

## 2019-10-16 RX ORDER — TIZANIDINE 4 MG/1
2-4 TABLET ORAL
Qty: 30 TAB | Refills: 0 | Status: SHIPPED | OUTPATIENT
Start: 2019-10-16 | End: 2020-08-18 | Stop reason: ALTCHOICE

## 2019-10-16 RX ORDER — FLUTICASONE PROPIONATE 50 MCG
SPRAY, SUSPENSION (ML) NASAL
Qty: 3 BOTTLE | Refills: 3 | Status: SHIPPED | OUTPATIENT
Start: 2019-10-16

## 2019-10-16 NOTE — PROGRESS NOTES
HISTORY OF PRESENT ILLNESS Geetha Thomas is a 53 y.o. female. HPI   ESTABLISHED patient here for LEFT breast pain times about two weeks. This is near her surgical site. She had this pain in the past in 2017, and she was seen for this then, however it did go away and has been intermittent until recently when it has been daily. The pain is described as \"soreness,\" and at its max the pain is 3/10. She is not feeling any new breast lumps, has no nipple discharge/retraction or skin change. Exams are difficult due to dense breast tissue. Last mammogram in 2016 was normal.  Breast MRI in 2017 was normal.   
[facility] Results (most recent): 
[place] [facility] [place]14/16 [facility] 3D MONICA W MAMMO BI SCREENING DIGTL Narrative **Final Report** 
  
 
ICD Codes / Adm. Diagnosis: 610.8  N60.99 / Other specified benign mammary Unspecified benign mammary d Examination:  [facility] 3D Octavio Keepers SCREEN  - [id number] - Apr 14 2016  2:35PM 
Accession No:  [id number] Reason:  screening REPORT: 
INDICATION:   Annual exam. 
COMPARISON: Mammogram(s), most recent, 2014. . 
COMPOSITION: There are scattered fibroglandular densities. .  
TECHNIQUE: Standard 2D, CC    and MLO views of each breast were performed  
with digital technique and subjected to computer-aided detection. Tomosynthesis of each breast was performed in CC and MLO projections. Karen Quan FINDINGS:  
    There is no significant abnormality or change from the prior study. The  
patient will be sent a result letter stating that her mammogram is normal. 
. IMPRESSION:     
BI-RADS Assessment Category 1: Negative. Karen Quan RECOMMENDATION:    
Mammogram in 1 year. Signing/Reading Doctor: Padmini Vargas ([id number]) Ayse Gamino ([id number])  Apr 14 2016  3:17PM                      
 
 
   
 
MRI Results (most recent): 
[place] [facility] on 12/08/17 MRI BREAST BI W WO CONT Narrative HISTORY: 53-year-old female with lifetime risk greater than 20%, presenting for 
high risk screening breast MRI. She has a history of atypical ductal hyperplasia 
in the left breast. 
 
COMPARISON: MRI 2016, 2014; mammography 4/14/2016 TECHNIQUE: 
Bilateral breast MRI was performed using a dedicated breast coil without 
compression with the patient in the prone position. Precontrast T1-weighted 
images with fat suppression were obtained followed by bolus injection of 5.5 mL Gadovist. Postcontrast dynamic and high-resolution images were acquired. T2-weighted axial imaging with fat suppression was also performed. The images 
were analyzed using CAD analysis, enhancement curves, digital subtraction, and 2 
and 3 dimensional reconstructions. FINDINGS: 
There is mild background parenchymal enhancement. Right breast: 
There is no suspicious masslike or nonmasslike enhancement within the right 
breast. There is no skin thickening or nipple retraction. There has been no 
significant change. There is no suspicious axillary or internal mammary chain lymphadenopathy. Left breast: 
There is no suspicious masslike or nonmasslike enhancement within the left 
breast. There is no skin thickening or nipple retraction. There has been no 
significant change. There is no suspicious axillary or internal mammary chain lymphadenopathy. Impression IMPRESSION: 
 
Right Breast: 1. BI-RADS Assessment Category 1: Negative. No evidence of breast carcinoma 
within the right breast. 
 
Left Breast: 1. BI-RADS Assessment Category 1: Negative. No evidence of breast carcinoma 
within the left breast. 
 
RECOMMENDATIONS: 
Recommend continued annual screening mammography as well as annual high risk 
screening breast MRI. A negative breast MRI examination speaks strongly against invasive cancer down 
to a detection threshold of 3 to 5 mm but may not detect some lower grade or in 
 situ carcinomas. Therefore, routine clinical and mammographic followup are 
recommended. A summary portfolio has been created in PACS. ROS Physical Exam 
 
ASSESSMENT and PLAN 
{ASSESSMENT/PLAN:63050}

## 2019-10-16 NOTE — PATIENT INSTRUCTIONS
Vaccine Information Statement    Influenza (Flu) Vaccine (Inactivated or Recombinant): What You Need to Know    Many Vaccine Information Statements are available in Persian and other languages. See www.immunize.org/vis  Hojas de información sobre vacunas están disponibles en español y en muchos otros idiomas. Visite www.immunize.org/vis    1. Why get vaccinated? Influenza vaccine can prevent influenza (flu). Flu is a contagious disease that spreads around the [facility] every year, usually between October and May. Anyone can get the flu, but it is more dangerous for some people. Infants and young children, people 72years of age and older, pregnant women, and people with certain health conditions or a weakened immune system are at greatest risk of flu complications. Pneumonia, bronchitis, sinus infections and ear infections are examples of flu-related complications. If you have a medical condition, such as heart disease, cancer or diabetes, flu can make it worse. Flu can cause fever and chills, sore throat, muscle aches, fatigue, cough, headache, and runny or stuffy nose. Some people may have vomiting and diarrhea, though this is more common in children than adults. Each year thousands of people in the [facility] die from flu, and many more are hospitalized. Flu vaccine prevents millions of illnesses and flu-related visits to the doctor each year. 2. Influenza vaccines     CDC recommends everyone 10months of age and older get vaccinated every flu season. Children 6 months through 6years of age may need 2 doses during a single flu season. Everyone else needs only 1 dose each flu season. It takes about 2 weeks for protection to develop after vaccination. There are many flu viruses, and they are always changing. Each year a new flu vaccine is made to protect against three or four viruses that are likely to cause disease in the upcoming flu season.  Even when the vaccine doesnt exactly match these viruses, it may still provide some protection. Influenza vaccine does not cause flu. Influenza vaccine may be given at the same time as other vaccines. 3. Talk with your health care provider    Tell your vaccine provider if the person getting the vaccine:   Has had an allergic reaction after a previous dose of influenza vaccine, or has any severe, life-threatening allergies.  Has ever had Guillain-Barré Syndrome (also called GBS). In some cases, your health care provider may decide to postpone influenza vaccination to a future visit. People with minor illnesses, such as a cold, may be vaccinated. People who are moderately or severely ill should usually wait until they recover before getting influenza vaccine. Your health care provider can give you more information. 4. Risks of a reaction     Soreness, redness, and swelling where shot is given, fever, muscle aches, and headache can happen after influenza vaccine.  There may be a very small increased risk of Guillain-Barré Syndrome (GBS) after inactivated influenza vaccine (the flu shot). Jose Velazquez children who get the flu shot along with pneumococcal vaccine (PCV13), and/or DTaP vaccine at the same time might be slightly more likely to have a seizure caused by fever. Tell your health care provider if a child who is getting flu vaccine has ever had a seizure. People sometimes faint after medical procedures, including vaccination. Tell your provider if you feel dizzy or have vision changes or ringing in the ears. As with any medicine, there is a very remote chance of a vaccine causing a severe allergic reaction, other serious injury, or death. 5. What if there is a serious problem? An allergic reaction could occur after the vaccinated person leaves the clinic.  If you see signs of a severe allergic reaction (hives, swelling of the face and throat, difficulty breathing, a fast heartbeat, dizziness, or weakness), call 9-1-1 and get the person to the nearest hospital.    For other signs that concern you, call your health care provider. Adverse reactions should be reported to the Vaccine Adverse Event Reporting System (VAERS). Your health care provider will usually file this report, or you can do it yourself. Visit the VAERS website at www.vaers. [facility].gov or call 7-445.787.6544. VAERS is only for reporting reactions, and VAERS staff do not give medical advice. 6. The National Vaccine Injury Compensation Program    The [facility] Vaccine Injury Compensation Program (VICP) is a federal program that was created to compensate people who may have been injured by certain vaccines. Visit the VICP website at www.[facility].gov/vaccinecompensation or call 2-772.987.7858 to learn about the program and about filing a claim. There is a time limit to file a claim for compensation. 7. How can I learn more?  Ask your health care provider.  Call your local or state health department.  Contact the Centers for Disease Control and Prevention (CDC):  - Call 4-710.121.5916 (1-800-CDC-INFO) or  - Visit CDCs influenza website at www.cdc.gov/flu    Vaccine Information Statement (Interim)  Inactivated Influenza Vaccine   8/15/2019  42 JOSE Mercer Parag [id number]   Department of Health and Human Services  Centers for Disease Control and Prevention    Office Use Only

## 2019-10-16 NOTE — PROGRESS NOTES
HISTORY OF PRESENT ILLNESS  Alisa Sneed is a 53 y.o. female. HPI  ESTABLISHED patient here for LEFT breast pain times about two weeks. This is near her surgical site. She had this pain in the past in 2017, and she was seen for this then, however it did go away and has been intermittent until recently when it has been daily. The pain is described as \"soreness,\" and at its max the pain is 3/10. She is not feeling any new breast lumps, has no nipple discharge/retraction or skin change. Exams are difficult due to dense breast tissue. Last mammogram in 2016 was normal.  Breast MRI in 2017 was normal.      [facility] Results (most recent):       Results from Hospital Encounter encounter on 04/14/16   [facility] 3D MONICA W MAMMO BI SCREENING DIGTL     Narrative **Final Report**        ICD Codes / Adm. Diagnosis: 610.8  N60.99 / Other specified benign mammary    Unspecified benign mammary d  Examination:  [facility] 3D Claudia Gerard SCREEN  - [id number] - Apr 14 2016  2:35PM  Accession No:  [id number]  Reason:  screening        REPORT:  INDICATION:   Annual exam.  COMPARISON: Mammogram(s), most recent, 2014. .  COMPOSITION: There are scattered fibroglandular densities. .   TECHNIQUE: Standard 2D, CC    and MLO views of each breast were performed   with digital technique and subjected to computer-aided detection. Tomosynthesis of each breast was performed in CC and MLO projections. Areli Pass FINDINGS:       There is no significant abnormality or change from the prior study. The   patient will be sent a result letter stating that her mammogram is normal.  .       IMPRESSION:      BI-RADS Assessment Category 1: Negative. Areli Pass   RECOMMENDATION:     Mammogram in 1 year.               Signing/Reading Doctor: Lord Hawk ([id number])    Los Richard ([id number])  Apr 14 2016  3:17PM                                    MRI Results (most recent):  Results from [place] encounter on 12/08/17   MRI BREAST BI W WO CONT     Narrative HISTORY: 76-year-old female with lifetime risk greater than 20%, presenting for  high risk screening breast MRI. She has a history of atypical ductal hyperplasia  in the left breast.     COMPARISON: MRI 2016, 2014; mammography 4/14/2016     TECHNIQUE:  Bilateral breast MRI was performed using a dedicated breast coil without  compression with the patient in the prone position. Precontrast T1-weighted  images with fat suppression were obtained followed by bolus injection of 5.5 mL  Gadovist. Postcontrast dynamic and high-resolution images were acquired. T2-weighted axial imaging with fat suppression was also performed. The images  were analyzed using CAD analysis, enhancement curves, digital subtraction, and 2  and 3 dimensional reconstructions.     FINDINGS:  There is mild background parenchymal enhancement.     Right breast:  There is no suspicious masslike or nonmasslike enhancement within the right  breast. There is no skin thickening or nipple retraction. There has been no  significant change.     There is no suspicious axillary or internal mammary chain lymphadenopathy.     Left breast:  There is no suspicious masslike or nonmasslike enhancement within the left  breast. There is no skin thickening or nipple retraction. There has been no  significant change.     There is no suspicious axillary or internal mammary chain lymphadenopathy.        Impression IMPRESSION:     Right Breast:  1. BI-RADS Assessment Category 1: Negative. No evidence of breast carcinoma  within the right breast.     Left Breast:  1. BI-RADS Assessment Category 1: Negative. No evidence of breast carcinoma  within the left breast.     RECOMMENDATIONS:  Recommend continued annual screening mammography as well as annual high risk  screening breast MRI.     A negative breast MRI examination speaks strongly against invasive cancer down  to a detection threshold of 3 to 5 mm but may not detect some lower grade or in  situ carcinomas. Therefore, routine clinical and mammographic followup are  recommended.     A summary portfolio has been created in PACS. Past Medical History:   Diagnosis Date    Allergic rhinitis     Cancer ([facility] [address]     skin    Hypothyroid     Unspecified hypothyroidism 12/6/2010       Past Surgical History:   Procedure Laterality Date    HX BREAST BIOPSY Left 2006    Excisional biopsy revealing ADH    HX PELVIC LAPAROSCOPY      HX WISDOM TEETH EXTRACTION      OR BIOPSY OF BREAST, NEEDLE CORE      times two    OR UNS ORAL SURG PROC BY REPORT         Social History     Socioeconomic History    Marital status:      Spouse name: Not on file    Number of children: Not on file    Years of education: Not on file    Highest education level: Not on file   Occupational History    Not on file   Social Needs    Financial resource strain: Not on file    Food insecurity:     Worry: Not on file     Inability: Not on file    Transportation needs:     Medical: Not on file     Non-medical: Not on file   Tobacco Use    Smoking status: Never Smoker    Smokeless tobacco: Never Used   Substance and Sexual Activity    Alcohol use:  Yes     Alcohol/week: 0.0 standard drinks     Types: 2 - 4 Glasses of wine per week    Drug use: No    Sexual activity: Yes     Partners: Male   Lifestyle    Physical activity:     Days per week: Not on file     Minutes per session: Not on file    Stress: Not on file   Relationships    Social connections:     Talks on phone: Not on file     Gets together: Not on file     Attends [employer] service: Not on file     Active member of club or organization: Not on file     Attends meetings of clubs or organizations: Not on file     Relationship status: Not on file    Intimate partner violence:     Fear of current or ex partner: Not on file     Emotionally abused: Not on file     Physically abused: Not on file     Forced sexual activity: Not on file   Other Topics Concern    Not on file   Social History Narrative    Not on file       Current Outpatient Medications on File Prior to Visit   Medication Sig Dispense Refill    zolpidem (AMBIEN) 5 mg tablet TAKE 1/2-1 TABLET BY MOUTH AT BEDTIME AS NEEDED FOR SLEEP 30 Tab 1    triamcinolone acetonide (KENALOG) 0.1 % dental paste by Dental route two (2) times a day. 5 g 1    citalopram (CELEXA) 20 mg tablet TAKE 1 AND 1/2 TABS BY MOUTH DAILY. 135 Tab 1    ALPRAZolam (XANAX) 0.25 mg tablet TAKE 1 TABLET BY MOUTH TWICE A DAY AS NEEDED 60 Tab 1    ARMOUR THYROID 30 mg tablet 1 1/2 in AM & 1 @ night   0    fluticasone (FLONASE) 50 mcg/actuation nasal spray INSTILL 2 SPRAYS IN EACH NOSTRIL DAILY 3 Bottle 3    metaxalone (SKELAXIN) 800 mg tablet Take 0.5 Tabs by mouth three (3) times daily as needed for Pain. 40 Tab 0     No current facility-administered medications on file prior to visit. No Known Allergies    OB History    None         ROS    Physical Exam   Cardiovascular: Normal rate and normal heart sounds. Pulmonary/Chest: Breath sounds normal. Right breast exhibits no inverted nipple, no mass, no nipple discharge, no skin change and no tenderness. Left breast exhibits no inverted nipple, no mass, no nipple discharge, no skin change and no tenderness. Breasts are symmetrical.       Lymphadenopathy:        Right cervical: No superficial cervical, no deep cervical and no posterior cervical adenopathy present. Left cervical: No superficial cervical, no deep cervical and no posterior cervical adenopathy present. Right axillary: No pectoral and no lateral adenopathy present. Left axillary: No pectoral and no lateral adenopathy present. BREAST ULTRASOUND  Indication: LEFT breast pain LOQ  Technique:  The area was scanned using a high-frequency linear-array near-field transducer  Findings: No abnormal mass, lesion, or shadowing noted; no cysts  Impression: Normal breast and scar tissue   Disposition: Will send for screening mammogram      ASSESSMENT and PLAN    ICD-10-CM ICD-9-CM    1. Breast cancer screening Z12.39 V76.10 [facility] 3D MONICA W MAMMO BI SCREENING INCL CAD   2. Fibrocystic breast changes, left N60.12 610.1       Established patient presents for evaluation of LEFT breast pain, and is doing well overall. Normal physical exam, with well healed bx scar at LEFT breast LOQ. US visualizes normal scar tissue. Breast pain, especially later in the day, is likely fibrocystic breast pain which may be increased to to presence of scar tissue. Pt due for mammogram; will send for screening mammogram, and plan further imaging from there. F/U in 1 year. This plan was reviewed with the patient and patient agrees. All questions were answered.     Written by Cristina Stout, as dictated by Dr. Allison Boyd MD.

## 2019-10-16 NOTE — COMMUNICATION BODY
HISTORY OF PRESENT ILLNESS  Henrietta Finnegan is a 53 y.o. female. HPI  ESTABLISHED patient here for LEFT breast pain times about two weeks. This is near her surgical site. She had this pain in the past in 2017, and she was seen for this then, however it did go away and has been intermittent until recently when it has been daily. The pain is described as \"soreness,\" and at its max the pain is 3/10. She is not feeling any new breast lumps, has no nipple discharge/retraction or skin change. Exams are difficult due to dense breast tissue. Last mammogram in 2016 was normal.  Breast MRI in 2017 was normal.      [facility] Results (most recent):       Results from Hospital Encounter encounter on 04/14/16   [facility] 3D MONICA W MAMMO BI SCREENING DIGTL     Narrative **Final Report**        ICD Codes / Adm. Diagnosis: 610.8  N60.99 / Other specified benign mammary    Unspecified benign mammary d  Examination:  [facility] 3D Mikhail Palm SCREEN  - [id number] - Apr 14 2016  2:35PM  Accession No:  [id number]  Reason:  screening        REPORT:  INDICATION:   Annual exam.  COMPARISON: Mammogram(s), most recent, 2014. .  COMPOSITION: There are scattered fibroglandular densities. .   TECHNIQUE: Standard 2D, CC    and MLO views of each breast were performed   with digital technique and subjected to computer-aided detection. Tomosynthesis of each breast was performed in CC and MLO projections. Brayan Ruiz FINDINGS:       There is no significant abnormality or change from the prior study. The   patient will be sent a result letter stating that her mammogram is normal.  .       IMPRESSION:      BI-RADS Assessment Category 1: Negative. Brayan Ruiz   RECOMMENDATION:     Mammogram in 1 year.               Signing/Reading Doctor: Moira Hernández ([id number])    ApprovedGeralynn Heimlich ([id number])  Apr 14 2016  3:17PM                                    MRI Results (most recent):       Results from [place] encounter on 12/08/17   MRI BREAST BI W WO CONT     Narrative HISTORY: 62-year-old female with lifetime risk greater than 20%, presenting for  high risk screening breast MRI. She has a history of atypical ductal hyperplasia  in the left breast.     COMPARISON: MRI 2016, 2014; mammography 4/14/2016     TECHNIQUE:  Bilateral breast MRI was performed using a dedicated breast coil without  compression with the patient in the prone position. Precontrast T1-weighted  images with fat suppression were obtained followed by bolus injection of 5.5 mL  Gadovist. Postcontrast dynamic and high-resolution images were acquired. T2-weighted axial imaging with fat suppression was also performed. The images  were analyzed using CAD analysis, enhancement curves, digital subtraction, and 2  and 3 dimensional reconstructions.     FINDINGS:  There is mild background parenchymal enhancement.     Right breast:  There is no suspicious masslike or nonmasslike enhancement within the right  breast. There is no skin thickening or nipple retraction. There has been no  significant change.     There is no suspicious axillary or internal mammary chain lymphadenopathy.     Left breast:  There is no suspicious masslike or nonmasslike enhancement within the left  breast. There is no skin thickening or nipple retraction. There has been no  significant change.     There is no suspicious axillary or internal mammary chain lymphadenopathy.        Impression IMPRESSION:     Right Breast:  1. BI-RADS Assessment Category 1: Negative. No evidence of breast carcinoma  within the right breast.     Left Breast:  1. BI-RADS Assessment Category 1: Negative. No evidence of breast carcinoma  within the left breast.     RECOMMENDATIONS:  Recommend continued annual screening mammography as well as annual high risk  screening breast MRI.     A negative breast MRI examination speaks strongly against invasive cancer down  to a detection threshold of 3 to 5 mm but may not detect some lower grade or in  situ carcinomas. Therefore, routine clinical and mammographic followup are  recommended.     A summary portfolio has been created in PACS. Past Medical History:   Diagnosis Date    Allergic rhinitis     Cancer ([facility] [address]     skin    Hypothyroid     Unspecified hypothyroidism 12/6/2010       Past Surgical History:   Procedure Laterality Date    HX BREAST BIOPSY Left 2006    Excisional biopsy revealing ADH    HX PELVIC LAPAROSCOPY      HX WISDOM TEETH EXTRACTION      [place] BIOPSY OF BREAST, NEEDLE CORE      times two    [place] UNS ORAL SURG PROC BY REPORT         Social History     Socioeconomic History    Marital status:      Spouse name: Not on file    Number of children: Not on file    Years of education: Not on file    Highest education level: Not on file   Occupational History    Not on file   Social Needs    Financial resource strain: Not on file    Food insecurity:     Worry: Not on file     Inability: Not on file    Transportation needs:     Medical: Not on file     Non-medical: Not on file   Tobacco Use    Smoking status: Never Smoker    Smokeless tobacco: Never Used   Substance and Sexual Activity    Alcohol use:  Yes     Alcohol/week: 0.0 standard drinks     Types: 2 - 4 Glasses of wine per week    Drug use: No    Sexual activity: Yes     Partners: Male   Lifestyle    Physical activity:     Days per week: Not on file     Minutes per session: Not on file    Stress: Not on file   Relationships    Social connections:     Talks on phone: Not on file     Gets together: Not on file     Attends [employer] service: Not on file     Active member of club or organization: Not on file     Attends meetings of clubs or organizations: Not on file     Relationship status: Not on file    Intimate partner violence:     Fear of current or ex partner: Not on file     Emotionally abused: Not on file     Physically abused: Not on file     Forced sexual activity: Not on file   Other Topics Concern    Not on file   Social History Narrative    Not on file       Current Outpatient Medications on File Prior to Visit   Medication Sig Dispense Refill    zolpidem (AMBIEN) 5 mg tablet TAKE 1/2-1 TABLET BY MOUTH AT BEDTIME AS NEEDED FOR SLEEP 30 Tab 1    triamcinolone acetonide (KENALOG) 0.1 % dental paste by Dental route two (2) times a day. 5 g 1    citalopram (CELEXA) 20 mg tablet TAKE 1 AND 1/2 TABS BY MOUTH DAILY. 135 Tab 1    ALPRAZolam (XANAX) 0.25 mg tablet TAKE 1 TABLET BY MOUTH TWICE A DAY AS NEEDED 60 Tab 1    ARMOUR THYROID 30 mg tablet 1 1/2 in AM & 1 @ night   0    fluticasone (FLONASE) 50 mcg/actuation nasal spray INSTILL 2 SPRAYS IN EACH NOSTRIL DAILY 3 Bottle 3    metaxalone (SKELAXIN) 800 mg tablet Take 0.5 Tabs by mouth three (3) times daily as needed for Pain. 40 Tab 0     No current facility-administered medications on file prior to visit. No Known Allergies    OB History    None         ROS    Physical Exam   Cardiovascular: Normal rate and normal heart sounds. Pulmonary/Chest: Breath sounds normal. Right breast exhibits no inverted nipple, no mass, no nipple discharge, no skin change and no tenderness. Left breast exhibits no inverted nipple, no mass, no nipple discharge, no skin change and no tenderness. Breasts are symmetrical.       Lymphadenopathy:        Right cervical: No superficial cervical, no deep cervical and no posterior cervical adenopathy present. Left cervical: No superficial cervical, no deep cervical and no posterior cervical adenopathy present. Right axillary: No pectoral and no lateral adenopathy present. Left axillary: No pectoral and no lateral adenopathy present. BREAST ULTRASOUND  Indication: LEFT breast pain LOQ  Technique:  The area was scanned using a high-frequency linear-array near-field transducer  Findings: No abnormal mass, lesion, or shadowing noted; no cysts  Impression: Normal breast and scar tissue   Disposition: Will send for screening mammogram      ASSESSMENT and PLAN    ICD-10-CM ICD-9-CM    1. Breast cancer screening Z12.39 V76.10 [facility] 3D MONICA W MAMMO BI SCREENING INCL CAD   2. Fibrocystic breast changes, left N60.12 610.1       Established patient presents for evaluation of LEFT breast pain, and is doing well overall. Normal physical exam, with well healed bx scar at LEFT breast LOQ. US visualizes normal scar tissue. Breast pain, especially later in the day, is likely fibrocystic breast pain which may be increased to to presence of scar tissue. Pt due for mammogram; will send for screening mammogram, and plan further imaging from there. F/U in 1 year. This plan was reviewed with the patient and patient agrees. All questions were answered.     Written by Andre Arzola, as dictated by Dr. Bisi Yeager MD.

## 2019-10-16 NOTE — PROGRESS NOTES
Subjective:   53 y.o. female for Well Woman Check. Her gyne and breast care is done elsewhere by her Ob-Gyne physician. Patient Active Problem List    Diagnosis Date Noted    At risk for breast cancer 09/27/2017    Advance directive discussed with patient 10/19/2016    Breast cyst 03/25/2016    Duct ectasia of breast 03/25/2016    Atypical ductal hyperplasia of breast 01/15/2014    Hypothyroidism 12/06/2010     Current Outpatient Medications   Medication Sig Dispense Refill    varicella-zoster recombinant, PF, (SHINGRIX, PF,) 50 mcg/0.5 mL susr injection 0.5 mL by IntraMUSCular route once for 1 dose. 0.5 mL 1    fluticasone propionate (FLONASE) 50 mcg/actuation nasal spray INSTILL 2 SPRAYS IN EACH NOSTRIL DAILY 3 Bottle 3    tiZANidine (ZANAFLEX) 4 mg tablet Take 0.5-1 Tabs by mouth three (3) times daily as needed (muscle pain). 30 Tab 0    zolpidem (AMBIEN) 5 mg tablet TAKE 1/2-1 TABLET BY MOUTH AT BEDTIME AS NEEDED FOR SLEEP 30 Tab 1    triamcinolone acetonide (KENALOG) 0.1 % dental paste by Dental route two (2) times a day. 5 g 1    citalopram (CELEXA) 20 mg tablet TAKE 1 AND 1/2 TABS BY MOUTH DAILY.  135 Tab 1    ALPRAZolam (XANAX) 0.25 mg tablet TAKE 1 TABLET BY MOUTH TWICE A DAY AS NEEDED 60 Tab 1    ARMOUR THYROID 30 mg tablet 1 1/2 in AM & 1 @ night   0     No Known Allergies  Past Medical History:   Diagnosis Date    Allergic rhinitis     Cancer ([facility] [address]     skin    Hypothyroid     Unspecified hypothyroidism 12/6/2010     Past Surgical History:   Procedure Laterality Date    HX BREAST BIOPSY Left 2006    Excisional biopsy revealing ADH    HX PELVIC LAPAROSCOPY      HX WISDOM TEETH EXTRACTION      [place] BIOPSY OF BREAST, NEEDLE CORE      times two    [place] UNS ORAL SURG PROC BY REPORT       Family History   Problem Relation Age of Onset    Thyroid Disease Mother         hypothyroid    Heart Disease Mother     Dementia Mother     Diabetes Father         small cell lung cancer    Cancer Father         Lung    No Known Problems Brother     Heart Disease Paternal Grandmother      Social History     Tobacco Use    Smoking status: Never Smoker    Smokeless tobacco: Never Used   Substance Use Topics    Alcohol use: Yes     Alcohol/week: 7.0 standard drinks     Types: 7 Glasses of wine per week        Lab Results   Component Value Date/Time    WBC 4.8 04/23/2018 11:24 AM    HGB 12.8 04/23/2018 11:24 AM    HCT 38.3 04/23/2018 11:24 AM    PLATELET 588 40/22/3262 11:24 AM    MCV 91 04/23/2018 11:24 AM     Lab Results   Component Value Date/Time    Cholesterol, total 236 (H) 04/23/2018 11:24 AM    HDL Cholesterol 89 04/23/2018 11:24 AM    LDL, calculated 132 (H) 04/23/2018 11:24 AM    Triglyceride 74 04/23/2018 11:24 AM    CHOL/HDL Ratio 2.9 12/09/2009 07:58 AM     Lab Results   Component Value Date/Time    ALT (SGPT) 17 04/23/2018 11:24 AM    AST (SGOT) 20 04/23/2018 11:24 AM    Alk. phosphatase 41 04/23/2018 11:24 AM    Bilirubin, total 0.4 04/23/2018 11:24 AM    Albumin 4.4 04/23/2018 11:24 AM    Protein, total 7.2 04/23/2018 11:24 AM    PLATELET 231 94/04/6100 11:24 AM     Lab Results   Component Value Date/Time    GFR est non-AA 80 04/23/2018 11:24 AM    GFR est AA 92 04/23/2018 11:24 AM    Creatinine 0.84 04/23/2018 11:24 AM    BUN 11 04/23/2018 11:24 AM    Sodium 143 04/23/2018 11:24 AM    Potassium 5.1 04/23/2018 11:24 AM    Chloride 101 04/23/2018 11:24 AM    CO2 26 04/23/2018 11:24 AM     Lab Results   Component Value Date/Time    TSH 0.132 (L) 04/23/2018 11:24 AM    Triiodothyronine (T3), free 5.0 (H) 04/23/2018 11:24 AM    T3,FREE 3.5 03/18/2010 10:21 AM    T4, Free 0.88 04/23/2018 11:24 AM      Lab Results   Component Value Date/Time    Glucose 84 04/23/2018 11:24 AM         Specific concerns today: Persistent left shoulder discomfort that occurs mostly with exertion or when she is stressed. She had a stress test done about 2 years ago that was negative.   She does note that this seems to have gotten worse recently. Anxiety reasonably controlled. Some ongoing issues with sleep and the Ambien is being taken nightly. She has been under increased stress recently with the loss of her mother and illness and her . She feels that the Ambien is helping to compensate for that. She has not been exercising. Her weight is up 4 pounds or more. .  Some itching in the left ear intermittently. She is tried multiple topical things over-the-counter with limited success. Review of Systems  A comprehensive review of systems was negative except for that written in the HPI. Objective:   Blood pressure 125/73, pulse 73, temperature 98.7 °F (37.1 °C), temperature source Oral, resp. rate 14, height 5' 3\" (1.6 m), weight 129 lb (58.5 kg), SpO2 98 %. Physical Examination:   General appearance - alert, well appearing, and in no distress  Eyes - pupils equal and reactive, extraocular eye movements intact  Ears - bilateral TM's and external ear canals normal  Nose - normal and patent, no erythema, discharge or polyps  Mouth - mucous membranes moist, pharynx normal without lesions  Neck - supple, no significant adenopathy  Lymphatics - no palpable lymphadenopathy, no hepatosplenomegaly  Chest - clear to auscultation, no wheezes, rales or rhonchi, symmetric air entry  Heart - normal rate, regular rhythm, normal S1, S2, no murmurs, rubs, clicks or gallops  Abdomen - soft, nontender, nondistended, no masses or organomegaly  Back exam - full range of motion, no tenderness, palpable spasm or pain on motion  Neurological - alert, oriented, normal speech, no focal findings or movement disorder noted  Musculoskeletal - no joint tenderness, deformity or swelling  Extremities - peripheral pulses normal, no pedal edema, no clubbing or cyanosis     Assessment/Plan:     lose weight, increase physical activity, routine labs ordered  Diagnoses and all orders for this visit:    1.  Routine general medical examination at a health care facility  -     CBC WITH AUTOMATED DIFF  -     METABOLIC PANEL, COMPREHENSIVE  -     LIPID PANEL  -     TSH AND FREE T4  -     varicella-zoster recombinant, PF, (SHINGRIX, PF,) 50 mcg/0.5 mL susr injection; 0.5 mL by IntraMUSCular route once for 1 dose.  -     REFERRAL TO OBSTETRICS AND GYNECOLOGY  -     fluticasone propionate (FLONASE) 50 mcg/actuation nasal spray; INSTILL 2 SPRAYS IN EACH NOSTRIL DAILY  -     METHYLMALONIC ACID  -     VITAMIN B12    2. Encounter for immunization  -     INFLUENZA VIRUS VAC QUAD,SPLIT,PRESV FREE SYRINGE IM  -     [place] IMMUNIZ ADMIN,1 SINGLE/COMB VAC/TOXOID    3. Hypothyroidism, unspecified type-appears euthyroid. Check levels and adjust meds. 4. Vitamin D deficiency-previously repleted. 5. Chest pain, unspecified type -unclear etiology. Could be musculoskeletal pain but worrisome given the exertional nature. Will check CT calcium score. If normal, pursue further evaluation for this pain. If abnormal, cardiology evaluation. -     CT HEART W/O CONT WITH CALCIUM; Future  6. Ear itching with a normal exam-we will try topical Sarna or vinegar and see if it is helpful. 7.  Muscle spasms intermittently-will use muscle relaxers as needed. We did change to tizanidine today for insurance reasons. Other orders  -     tiZANidine (ZANAFLEX) 4 mg tablet; Take 0.5-1 Tabs by mouth three (3) times daily as needed (muscle pain).

## 2019-10-23 ENCOUNTER — DOCUMENTATION ONLY (OUTPATIENT)
Dept: SURGERY | Age: 54
End: 2019-10-23

## 2019-10-23 NOTE — PROGRESS NOTES
Returned call to Elpidio Whitfield at [facility], re: duplicate mammogram order in chart. She just had mammogram done last week 10/16/19 bi-rads 1. I told her that she can disregard the duplicate mammogram order at this time. [facility] Results (most recent):  Results from [place] encounter on 10/16/19   [facility] 3D MONICA W MAMMO BI SCREENING INCL CAD    Narrative STUDY: Bilateral digital screening mammogram with 3-D tomosynthesis    INDICATION:  Screening. COMPARISON: 2014, 2016    BREAST COMPOSITION: The breasts are heterogeneously dense, which may obscure  small masses. FINDINGS: Bilateral digital screening mammography was performed and is  interpreted in conjunction with a computer assisted detection (CAD) system. Additionally, tomosynthesis of both breasts in the CC and MLO projections was  performed. No suspicious masses or calcifications are identified. There has been  no significant change. Impression IMPRESSION:  BI-RADS 1: Negative. No mammographic evidence of malignancy. RECOMMENDATIONS:  Next screening mammogram is recommended in one year. Notably, the patient's  lifetime risk of breast cancer according to the [address] is 22%. Therefore,  annual high risk screening breast MRI is recommended. The patient will be notified of these results.

## 2019-11-19 ENCOUNTER — HOSPITAL ENCOUNTER (OUTPATIENT)
Dept: CT IMAGING | Age: 54
Discharge: HOME | End: 2019-11-19
Attending: INTERNAL MEDICINE
Payer: SELF-PAY

## 2019-11-19 DIAGNOSIS — R07.9 CHEST PAIN, UNSPECIFIED TYPE: ICD-10-CM

## 2019-11-19 PROCEDURE — 75571 CT HRT W/O DYE W/CA TEST: CPT

## 2019-11-22 RX ORDER — CITALOPRAM 20 MG/1
TABLET, FILM COATED ORAL
Qty: 135 TAB | Refills: 1 | Status: SHIPPED | OUTPATIENT
Start: 2019-11-22 | End: 2021-04-28

## 2019-11-29 DIAGNOSIS — Z00.00 ROUTINE GENERAL MEDICAL EXAMINATION AT A HEALTH CARE FACILITY: ICD-10-CM

## 2019-12-01 RX ORDER — ZOLPIDEM TARTRATE 5 MG/1
TABLET ORAL
Qty: 30 TAB | Refills: 1 | Status: SHIPPED | OUTPATIENT
Start: 2019-12-01 | End: 2020-02-20

## 2020-02-14 ENCOUNTER — TELEPHONE (OUTPATIENT)
Dept: SLEEP MEDICINE | Age: 55
End: 2020-02-14

## 2020-02-20 DIAGNOSIS — Z00.00 ROUTINE GENERAL MEDICAL EXAMINATION AT A HEALTH CARE FACILITY: ICD-10-CM

## 2020-02-20 RX ORDER — ZOLPIDEM TARTRATE 5 MG/1
TABLET ORAL
Qty: 30 TAB | Refills: 1 | Status: SHIPPED | OUTPATIENT
Start: 2020-02-20 | End: 2020-04-24

## 2020-04-24 DIAGNOSIS — Z00.00 ROUTINE GENERAL MEDICAL EXAMINATION AT A HEALTH CARE FACILITY: ICD-10-CM

## 2020-04-24 RX ORDER — ZOLPIDEM TARTRATE 5 MG/1
TABLET ORAL
Qty: 30 TAB | Refills: 1 | Status: SHIPPED | OUTPATIENT
Start: 2020-04-24 | End: 2021-04-28

## 2020-08-18 ENCOUNTER — OFFICE VISIT (OUTPATIENT)
Dept: INTERNAL MEDICINE CLINIC | Age: 55
End: 2020-08-18
Payer: COMMERCIAL

## 2020-08-18 VITALS
HEIGHT: 63 IN | TEMPERATURE: 97.8 F | BODY MASS INDEX: 21.97 KG/M2 | RESPIRATION RATE: 16 BRPM | WEIGHT: 124 LBS | SYSTOLIC BLOOD PRESSURE: 124 MMHG | HEART RATE: 75 BPM | OXYGEN SATURATION: 100 % | DIASTOLIC BLOOD PRESSURE: 79 MMHG

## 2020-08-18 DIAGNOSIS — K92.1 BLACK STOOL: Primary | ICD-10-CM

## 2020-08-18 DIAGNOSIS — Z20.822 EXPOSURE TO COVID-19 VIRUS: ICD-10-CM

## 2020-08-18 DIAGNOSIS — E03.9 HYPOTHYROIDISM, UNSPECIFIED TYPE: ICD-10-CM

## 2020-08-18 DIAGNOSIS — E78.2 MIXED HYPERLIPIDEMIA: ICD-10-CM

## 2020-08-18 PROCEDURE — 82272 OCCULT BLD FECES 1-3 TESTS: CPT | Performed by: INTERNAL MEDICINE

## 2020-08-18 PROCEDURE — 99214 OFFICE O/P EST MOD 30 MIN: CPT | Performed by: INTERNAL MEDICINE

## 2020-08-18 NOTE — PROGRESS NOTES
HPI:  Dom Rodriguez is a 63y.o. year old female who is here for several issues. She has had some stomach discomfort over the last few days. On Friday and Saturday of last weekend she had dark black stools. By Sunday her stools had started to improve but were still quite dark. She brings in a specimen from Sunday for testing. No vomiting. No hematochezia. No hematemesis. No fevers or chills. She has been under increased stress. She does continue to drink coffee on a regular basis. She wondered about getting COVID antibody testing as she is concerned about whether she could have been exposed. She did recently see her endocrinologist and testing was good for her thyroid. She has not had any other blood work done in the last 2 years. Past Medical History:   Diagnosis Date    Allergic rhinitis     Cancer ([facility] [address]     skin    Hypothyroid     Unspecified hypothyroidism 12/6/2010       Past Surgical History:   Procedure Laterality Date    HX BREAST BIOPSY Left 2006    Excisional biopsy revealing ADH    HX PELVIC LAPAROSCOPY      HX WISDOM TEETH EXTRACTION      [place] BIOPSY OF BREAST, NEEDLE CORE      times two    [place] UNS ORAL SURG PROC BY REPORT         Prior to Admission medications    Medication Sig Start Date End Date Taking? Authorizing Provider   zolpidem (AMBIEN) 5 mg tablet TAKE 1/2 TO 1 TABLET BY MOUTH AT BEDTIME AS NEEDED FOR SLEEP 4/24/20  Yes Stephanie Dasilva III, MD   citalopram (CELEXA) 20 mg tablet TAKE 1 AND 1/2 TABS BY MOUTH DAILY. 11/22/19  Yes Madi Cardenas MD   fluticasone propionate (FLONASE) 50 mcg/actuation nasal spray INSTILL 2 SPRAYS IN EACH NOSTRIL DAILY  Patient taking differently: INSTILL 2 SPRAYS IN EACH NOSTRIL DAILY as needed. 10/16/19  Yes Madi Cardenas MD   triamcinolone acetonide (KENALOG) 0.1 % dental paste by Dental route two (2) times a day.  7/31/19  Yes Madi Cardenas MD   ALPRAZolam Nyasia Sol) 0.25 mg tablet TAKE 1 TABLET BY MOUTH TWICE A DAY AS NEEDED 10/19/16  Yes MD PAULINE [place] THYROID 30 mg tablet 1 1/2 in AM & 1 @ night  1/17/16  Yes Provider, Historical   tiZANidine (ZANAFLEX) 4 mg tablet Take 0.5-1 Tabs by mouth three (3) times daily as needed (muscle pain). 10/16/19 8/18/20  Padmini Cardoza MD       Social History     Socioeconomic History    Marital status:      Spouse name: Not on file    Number of children: Not on file    Years of education: Not on file    Highest education level: Not on file   Occupational History    Not on file   Social Needs    Financial resource strain: Not on file    Food insecurity     Worry: Not on file     Inability: Not on file    Transportation needs     Medical: Not on file     Non-medical: Not on file   Tobacco Use    Smoking status: Never Smoker    Smokeless tobacco: Never Used   Substance and Sexual Activity    Alcohol use:  Yes     Alcohol/week: 7.0 - 14.0 standard drinks     Types: 7 - 14 Glasses of wine per week    Drug use: No    Sexual activity: Yes     Partners: Male   Lifestyle    Physical activity     Days per week: Not on file     Minutes per session: Not on file    Stress: Not on file   Relationships    Social connections     Talks on phone: Not on file     Gets together: Not on file     Attends Zoroastrianism service: Not on file     Active member of club or organization: Not on file     Attends meetings of clubs or organizations: Not on file     Relationship status: Not on file    Intimate partner violence     Fear of current or ex partner: Not on file     Emotionally abused: Not on file     Physically abused: Not on file     Forced sexual activity: Not on file   Other Topics Concern    Not on file   Social History Narrative    Not on file          ROS  Per HPI    Visit Vitals  /79   Pulse 75   Temp 97.8 °F (36.6 °C) (Temporal)   Resp 16   Ht 5' 3\" (1.6 m)   Wt 124 lb (56.2 kg)   SpO2 100%   BMI 21.97 kg/m²         Physical Exam   Physical Examination: General appearance - alert, well appearing, and in no distress  Chest - clear to auscultation, no wheezes, rales or rhonchi, symmetric air entry  Heart - normal rate, regular rhythm, normal S1, S2, no murmurs, rubs, clicks or gallops  Abdomen - soft, nontender, nondistended, no masses or organomegaly  Neurological - alert, oriented, normal speech, no focal findings or movement disorder noted  Musculoskeletal - no joint tenderness, deformity or swelling    Hemoccult in the office - negative. Assessment/Plan:  Diagnoses and all orders for this visit:    1. Black stool -unclear etiology. Could be blood related or dietary. Given the fact she was negative for blood here and did not have orthostasis, will send for blood counts and FIT testing. If positive, consider an upper endoscopy. -     CBC WITH AUTOMATED DIFF  -     OCCULT BLOOD IMMUNOASSAY,DIAGNOSTIC    2. Hypothyroidism, unspecified type -stable and pulmonology.  -     METABOLIC PANEL, COMPREHENSIVE    3. Mixed hyperlipidemia -repeat lab work today for stability.  -     METABOLIC PANEL, COMPREHENSIVE  -     LIPID PANEL    4. Exposure to COVID-19 virus -check antibodies today at her request.  She was made aware of the limitations of this test and is aware that it would not likely change her behavior but still wished to have testing.  -     SARS-COV-2 AB, IGG       Follow-up and Dispositions    · Return if symptoms worsen or fail to improve. Advised her to call back or return to office if symptoms worsen/change/persist.  Discussed expected course/resolution/complications of diagnosis in detail with patient. Medication risks/benefits/costs/interactions/alternatives discussed with patient. She was given an after visit summary which includes diagnoses, current medications, & vitals. She expressed understanding with the diagnosis and plan.

## 2020-08-19 LAB
ALBUMIN SERPL-MCNC: 4.7 G/DL (ref 3.8–4.9)
ALBUMIN/GLOB SERPL: 1.7 {RATIO} (ref 1.2–2.2)
ALP SERPL-CCNC: 55 IU/L (ref 39–117)
ALT SERPL-CCNC: 18 IU/L (ref 0–32)
AST SERPL-CCNC: 21 IU/L (ref 0–40)
BASOPHILS # BLD AUTO: 0 X10E3/UL (ref 0–0.2)
BASOPHILS NFR BLD AUTO: 1 %
BILIRUB SERPL-MCNC: 0.4 MG/DL (ref 0–1.2)
BUN SERPL-MCNC: 17 MG/DL (ref 6–24)
BUN/CREAT SERPL: 22 (ref 9–23)
CALCIUM SERPL-MCNC: 10 MG/DL (ref 8.7–10.2)
CHLORIDE SERPL-SCNC: 102 MMOL/L (ref 96–106)
CHOLEST SERPL-MCNC: 265 MG/DL (ref 100–199)
CO2 SERPL-SCNC: 26 MMOL/L (ref 20–29)
CREAT SERPL-MCNC: 0.77 MG/DL (ref 0.57–1)
EOSINOPHIL # BLD AUTO: 0.1 X10E3/UL (ref 0–0.4)
EOSINOPHIL NFR BLD AUTO: 2 %
ERYTHROCYTE [DISTWIDTH] IN BLOOD BY AUTOMATED COUNT: 13 % (ref 11.7–15.4)
GLOBULIN SER CALC-MCNC: 2.8 G/DL (ref 1.5–4.5)
GLUCOSE SERPL-MCNC: 84 MG/DL (ref 65–99)
HCT VFR BLD AUTO: 38.4 % (ref 34–46.6)
HDLC SERPL-MCNC: 88 MG/DL
HEMOCCULT STL QL: NEGATIVE
HGB BLD-MCNC: 12.8 G/DL (ref 11.1–15.9)
IMM GRANULOCYTES # BLD AUTO: 0 X10E3/UL (ref 0–0.1)
IMM GRANULOCYTES NFR BLD AUTO: 0 %
LDLC SERPL CALC-MCNC: 162 MG/DL (ref 0–99)
LYMPHOCYTES # BLD AUTO: 1.7 X10E3/UL (ref 0.7–3.1)
LYMPHOCYTES NFR BLD AUTO: 31 %
MCH RBC QN AUTO: 30.1 PG (ref 26.6–33)
MCHC RBC AUTO-ENTMCNC: 33.3 G/DL (ref 31.5–35.7)
MCV RBC AUTO: 90 FL (ref 79–97)
MONOCYTES # BLD AUTO: 0.4 X10E3/UL (ref 0.1–0.9)
MONOCYTES NFR BLD AUTO: 7 %
NEUTROPHILS # BLD AUTO: 3.3 X10E3/UL (ref 1.4–7)
NEUTROPHILS NFR BLD AUTO: 59 %
PLATELET # BLD AUTO: 275 X10E3/UL (ref 150–450)
POTASSIUM SERPL-SCNC: 5 MMOL/L (ref 3.5–5.2)
PROT SERPL-MCNC: 7.5 G/DL (ref 6–8.5)
RBC # BLD AUTO: 4.25 X10E6/UL (ref 3.77–5.28)
SARS-COV-2 AB, IGG, CORG1M: NEGATIVE
SODIUM SERPL-SCNC: 143 MMOL/L (ref 134–144)
TRIGL SERPL-MCNC: 73 MG/DL (ref 0–149)
VALID INTERNAL CONTROL?: YES
VLDLC SERPL CALC-MCNC: 15 MG/DL (ref 5–40)
WBC # BLD AUTO: 5.5 X10E3/UL (ref 3.4–10.8)

## 2020-09-02 LAB — HEMOCCULT STL QL IA: NEGATIVE

## 2020-10-21 ENCOUNTER — OFFICE VISIT (OUTPATIENT)
Dept: SURGERY | Age: 55
End: 2020-10-21
Payer: COMMERCIAL

## 2020-10-21 VITALS
DIASTOLIC BLOOD PRESSURE: 74 MMHG | BODY MASS INDEX: 21.97 KG/M2 | SYSTOLIC BLOOD PRESSURE: 112 MMHG | HEIGHT: 63 IN | WEIGHT: 124 LBS | TEMPERATURE: 97.1 F | HEART RATE: 79 BPM

## 2020-10-21 DIAGNOSIS — N60.92 ATYPICAL DUCTAL HYPERPLASIA OF LEFT BREAST: ICD-10-CM

## 2020-10-21 DIAGNOSIS — Z12.31 BREAST CANCER SCREENING BY MAMMOGRAM: ICD-10-CM

## 2020-10-21 DIAGNOSIS — Z91.89 AT HIGH RISK FOR BREAST CANCER: Primary | ICD-10-CM

## 2020-10-21 PROCEDURE — 99213 OFFICE O/P EST LOW 20 MIN: CPT | Performed by: SURGERY

## 2020-10-21 NOTE — PROGRESS NOTES
HISTORY OF PRESENT ILLNESS  Faiza Payne is a 63 y.o. female. HPI  ESTABLISHED patient here for annual follow up LEFT ADH. Always has lumpy breasts and doesn't always do self breast exams. No pain. History of excisional biopsy for LEFT breast ADH in 2009.     Last breast MRI in 2017.     [facility] Results (most recent):       Results from Hospital Encounter encounter on 10/16/19   [facility] 3D MONICA W MAMMO BI SCREENING INCL CAD     Narrative STUDY: Bilateral digital screening mammogram with 3-D tomosynthesis     INDICATION:  Screening.     COMPARISON: 2014, 2016     BREAST COMPOSITION: The breasts are heterogeneously dense, which may obscure  small masses.     FINDINGS: Bilateral digital screening mammography was performed and is  interpreted in conjunction with a computer assisted detection (CAD) system. Additionally, tomosynthesis of both breasts in the CC and MLO projections was  performed. No suspicious masses or calcifications are identified. There has been  no significant change.        Impression IMPRESSION:  BI-RADS 1: Negative. No mammographic evidence of malignancy.     RECOMMENDATIONS:  Next screening mammogram is recommended in one year. Notably, the patient's  lifetime risk of breast cancer according to the [address] is 22%. Therefore,  annual high risk screening breast MRI is recommended.     The patient will be notified of these results.        Past Medical History:   Diagnosis Date    Allergic rhinitis     Cancer ([address]     skin    Hypothyroid     Unspecified hypothyroidism 12/6/2010       Past Surgical History:   Procedure Laterality Date    HX BREAST BIOPSY Left 2006    Excisional biopsy revealing ADH    HX PELVIC LAPAROSCOPY      HX WISDOM TEETH EXTRACTION      [place] BIOPSY OF BREAST, NEEDLE CORE      times two    [place] UNS ORAL SURG PROC BY REPORT         Social History     Socioeconomic History    Marital status:      Spouse name: Not on file    Number of children: Not on file    Years of education: Not on file    Highest education level: Not on file   Occupational History    Not on file   Social Needs    Financial resource strain: Not on file    Food insecurity     Worry: Not on file     Inability: Not on file    Transportation needs     Medical: Not on file     Non-medical: Not on file   Tobacco Use    Smoking status: Never Smoker    Smokeless tobacco: Never Used   Substance and Sexual Activity    Alcohol use: Yes     Alcohol/week: 7.0 - 14.0 standard drinks     Types: 7 - 14 Glasses of wine per week    Drug use: No    Sexual activity: Yes     Partners: Male   Lifestyle    Physical activity     Days per week: Not on file     Minutes per session: Not on file    Stress: Not on file   Relationships    Social connections     Talks on phone: Not on file     Gets together: Not on file     Attends Caodaism service: Not on file     Active member of club or organization: Not on file     Attends meetings of clubs or organizations: Not on file     Relationship status: Not on file    Intimate partner violence     Fear of current or ex partner: Not on file     Emotionally abused: Not on file     Physically abused: Not on file     Forced sexual activity: Not on file   Other Topics Concern    Not on file   Social History Narrative    Not on file       Current Outpatient Medications on File Prior to Visit   Medication Sig Dispense Refill    zolpidem (AMBIEN) 5 mg tablet TAKE 1/2 TO 1 TABLET BY MOUTH AT BEDTIME AS NEEDED FOR SLEEP 30 Tab 1    citalopram (CELEXA) 20 mg tablet TAKE 1 AND 1/2 TABS BY MOUTH DAILY. 135 Tab 1    fluticasone propionate (FLONASE) 50 mcg/actuation nasal spray INSTILL 2 SPRAYS IN EACH NOSTRIL DAILY (Patient taking differently: INSTILL 2 SPRAYS IN EACH NOSTRIL DAILY as needed.) 3 Bottle 3    triamcinolone acetonide (KENALOG) 0.1 % dental paste by Dental route two (2) times a day.  5 g 1    ALPRAZolam (XANAX) 0.25 mg tablet TAKE 1 TABLET BY MOUTH TWICE A DAY AS NEEDED 60 Tab 1    ARMOUR THYROID 30 mg tablet 1 1/2 in AM & 1 @ night   0     No current facility-administered medications on file prior to visit. No Known Allergies    OB History    No obstetric history on file. ROS    Physical Exam  Exam conducted with a chaperone present. Cardiovascular:      Rate and Rhythm: Normal rate and regular rhythm. Heart sounds: Normal heart sounds. Pulmonary:      Breath sounds: Normal breath sounds. Chest:      Breasts: Breasts are symmetrical.         Right: Normal. No swelling, bleeding, inverted nipple, mass, nipple discharge, skin change or tenderness. Left: Normal. No swelling, bleeding, inverted nipple, mass, nipple discharge, skin change or tenderness. Lymphadenopathy:      Cervical:      Right cervical: No superficial, deep or posterior cervical adenopathy. Left cervical: No superficial, deep or posterior cervical adenopathy. Upper Body:      Right upper body: No supraclavicular or axillary adenopathy. Left upper body: No supraclavicular or axillary adenopathy. ASSESSMENT and PLAN    ICD-10-CM ICD-9-CM    1. At high risk for breast cancer  Z91.89 V49.89    2. Atypical ductal hyperplasia of left breast  N60.92 610.8       Established pt presents for annual high risk screening, and is doing well overall. Physical exam today normal. Discussed risk associated with ADH and atypia. Last mammogram was normal. Last breast MRI was in 2017. Will repeat both mammogram and MRI. Pt to continue annual 3D mammography, and we will determine plan for continuing MRI after review of imaging results. F/U in 1 year. This plan was reviewed with the patient and patient agrees. All questions were answered.     Written by Wander Chow, as dictated by Dr. Jairon Phipps MD.

## 2020-10-21 NOTE — PROGRESS NOTES
HISTORY OF PRESENT ILLNESS Ernst Hines is a 63 y.o. female. HPI  ESTABLISHED patient here for annual follow up LEFT ADH. Always have lump breasts and doesn't always do self breast exams. No pain. History of excisional biopsy for LEFT breast ADH in 2009. Last breast MRI in 2017. [facility] Results (most recent): 
Results from Hospital Encounter encounter on 10/16/19 [facility] 3D MONICA W MAMMO BI SCREENING INCL CAD Narrative STUDY: Bilateral digital screening mammogram with 3-D tomosynthesis INDICATION:  Screening. COMPARISON: 2014, 2016 BREAST COMPOSITION: The breasts are heterogeneously dense, which may obscure 
small masses. FINDINGS: Bilateral digital screening mammography was performed and is 
interpreted in conjunction with a computer assisted detection (CAD) system. Additionally, tomosynthesis of both breasts in the CC and MLO projections was 
performed. No suspicious masses or calcifications are identified. There has been 
no significant change. Impression IMPRESSION: 
BI-RADS 1: Negative. No mammographic evidence of malignancy. RECOMMENDATIONS: 
Next screening mammogram is recommended in one year. Notably, the patient's 
lifetime risk of breast cancer according to the [address] is 22%. Therefore, 
annual high risk screening breast MRI is recommended. The patient will be notified of these results. ROS Physical Exam 
 
ASSESSMENT and PLAN 
{ASSESSMENT/PLAN:[id number]}

## 2020-10-21 NOTE — PATIENT INSTRUCTIONS
Fibrocystic Breast Changes: Care Instructions  Your Care Instructions  Fibrocystic breast changes cause many small lumps to form in your breast. Some areas of your breast may feel thicker or denser than other areas. Your breasts also may feel sore or tender. You may notice lumps in both breasts around the nipple and in the upper, outer part of the breasts, especially before your menstrual period. The lumps may come and go and change size in just a few days. Fibrocystic breast changes are normal and are not cancer. Treatment is not usually needed. If you have a hard, grainy lump, unusual pain, or nipple discharge, your doctor may order tests to look for a more serious problem. Talk to your doctor about the need for regular mammograms. Follow-up care is a key part of your treatment and safety. Be sure to make and go to all appointments, and call your doctor if you are having problems. It's also a good idea to know your test results and keep a list of the medicines you take. How can you care for yourself at home? · Take an over-the-counter pain medicine, such as acetaminophen (Tylenol), ibuprofen (Advil, Motrin), or naproxen (Aleve). Read and follow all instructions on the label. · Do not take two or more pain medicines at the same time unless the doctor told you to. Many pain medicines have acetaminophen, which is Tylenol. Too much acetaminophen (Tylenol) can be harmful. · Wear a supportive bra, such as a sports bra or jog bra. · You may want to limit caffeine. Some women say that cutting back on caffeine reduces breast tenderness. · A diet very low in fat (about 15% of daily diet) may reduce breast tenderness. Talk to your doctor about whether you should try a very low-fat diet. When should you call for help?   Watch closely for changes in your health, and be sure to contact your doctor if:    · You do not get better as expected.     · Your breast has changed.     · You have pain in your breast.     · You have a discharge from your nipple.     · A breast lump changes or does not go away. Where can you learn more? Go to http://suzanne-john.info/  Enter [id number] in the search box to learn more about \"Fibrocystic Breast Changes: Care Instructions. \"  Current as of: November 8, 2019               Content Version: 12.6  © 4275-2938 [employer]. Care instructions adapted under license by [employer] (which disclaims liability or warranty for this information). If you have questions about a medical condition or this instruction, always ask your healthcare professional. [place] [address] any warranty or liability for your use of this information.

## 2020-12-11 ENCOUNTER — PATIENT MESSAGE (OUTPATIENT)
Dept: INTERNAL MEDICINE CLINIC | Age: 55
End: 2020-12-11

## 2020-12-11 RX ORDER — METAXALONE 800 MG/1
400-800 TABLET ORAL
Qty: 30 TAB | Refills: 0 | Status: SHIPPED | OUTPATIENT
Start: 2020-12-11 | End: 2022-07-24

## 2021-03-04 ENCOUNTER — HOSPITAL ENCOUNTER (OUTPATIENT)
Dept: MAMMOGRAPHY | Age: 56
Discharge: HOME | End: 2021-03-04
Attending: SURGERY
Payer: COMMERCIAL

## 2021-03-04 DIAGNOSIS — Z12.31 BREAST CANCER SCREENING BY MAMMOGRAM: ICD-10-CM

## 2021-03-04 PROCEDURE — 77063 BREAST TOMOSYNTHESIS BI: CPT

## 2021-03-05 ENCOUNTER — HOSPITAL ENCOUNTER (OUTPATIENT)
Dept: MRI IMAGING | Age: 56
Discharge: HOME | End: 2021-03-05
Attending: SURGERY
Payer: COMMERCIAL

## 2021-03-05 VITALS — WEIGHT: 124 LBS | BODY MASS INDEX: 21.97 KG/M2

## 2021-03-05 DIAGNOSIS — N60.92 ATYPICAL DUCTAL HYPERPLASIA OF LEFT BREAST: ICD-10-CM

## 2021-03-05 DIAGNOSIS — Z91.89 AT HIGH RISK FOR BREAST CANCER: ICD-10-CM

## 2021-03-05 PROCEDURE — A9585 GADOBUTROL INJECTION: HCPCS | Performed by: SURGERY

## 2021-03-05 PROCEDURE — 74011250636 HC RX REV CODE- 250/636: Performed by: SURGERY

## 2021-03-05 PROCEDURE — 77049 MRI BREAST C-+ W/CAD BI: CPT

## 2021-03-05 RX ADMIN — GADOBUTROL 6 ML: 604.72 INJECTION INTRAVENOUS at 15:00

## 2021-03-08 ENCOUNTER — TELEPHONE (OUTPATIENT)
Dept: SURGERY | Age: 56
End: 2021-03-08

## 2021-04-28 DIAGNOSIS — Z00.00 ROUTINE GENERAL MEDICAL EXAMINATION AT A HEALTH CARE FACILITY: ICD-10-CM

## 2021-04-28 RX ORDER — ZOLPIDEM TARTRATE 5 MG/1
TABLET ORAL
Qty: 30 TAB | Refills: 1 | Status: SHIPPED | OUTPATIENT
Start: 2021-04-28 | End: 2021-09-21

## 2021-04-28 RX ORDER — CITALOPRAM 20 MG/1
TABLET, FILM COATED ORAL
Qty: 135 TAB | Refills: 1 | Status: SHIPPED | OUTPATIENT
Start: 2021-04-28 | End: 2021-10-27

## 2021-09-21 DIAGNOSIS — Z00.00 ROUTINE GENERAL MEDICAL EXAMINATION AT A HEALTH CARE FACILITY: ICD-10-CM

## 2021-09-21 RX ORDER — ZOLPIDEM TARTRATE 5 MG/1
TABLET ORAL
Qty: 30 TABLET | Refills: 1 | Status: SHIPPED | OUTPATIENT
Start: 2021-09-21 | End: 2022-03-01

## 2021-09-23 ENCOUNTER — DOCUMENTATION ONLY (OUTPATIENT)
Dept: SURGERY | Age: 56
End: 2021-09-23

## 2021-10-27 ENCOUNTER — OFFICE VISIT (OUTPATIENT)
Dept: SURGERY | Age: 56
End: 2021-10-27
Payer: COMMERCIAL

## 2021-10-27 VITALS
HEIGHT: 63 IN | HEART RATE: 71 BPM | BODY MASS INDEX: 21.97 KG/M2 | WEIGHT: 124 LBS | SYSTOLIC BLOOD PRESSURE: 105 MMHG | DIASTOLIC BLOOD PRESSURE: 56 MMHG

## 2021-10-27 DIAGNOSIS — Z91.89 AT HIGH RISK FOR BREAST CANCER: Primary | ICD-10-CM

## 2021-10-27 DIAGNOSIS — Z12.31 BREAST CANCER SCREENING BY MAMMOGRAM: ICD-10-CM

## 2021-10-27 PROCEDURE — 99213 OFFICE O/P EST LOW 20 MIN: CPT | Performed by: SURGERY

## 2021-10-27 RX ORDER — CITALOPRAM 20 MG/1
TABLET, FILM COATED ORAL
Qty: 135 TABLET | Refills: 1 | Status: SHIPPED | OUTPATIENT
Start: 2021-10-27 | End: 2022-05-03

## 2021-10-27 NOTE — PROGRESS NOTES
HISTORY OF PRESENT ILLNESS  Dusty Sneed is a 61 y.o. female. HPI ESTABLISHED patient here for high risk screening follow up. She is not having any breast problems at this time. History of LEFT breast ADH, had excisional biopsy in 2009. Breast imaging-  [facility] Results (most recent):  Results from Hospital Encounter encounter on 03/04/21    [facility] 3D MONICA W MAMMO BI SCREENING INCL CAD    Narrative  STUDY: Bilateral digital screening mammogram with 3-D tomosynthesis    INDICATION:  Screening. COMPARISON: 2019, 2017, 2016    BREAST COMPOSITION: There are scattered areas of fibroglandular density. FINDINGS: Bilateral digital screening mammography was performed and is  interpreted in conjunction with a computer assisted detection (CAD) system. Additionally, tomosynthesis of both breasts in the CC and MLO projections was  performed. No suspicious masses or calcifications are identified. There has been  no significant change. Impression  BI-RADS 1: Negative. No mammographic evidence of malignancy. RECOMMENDATIONS:  Next screening mammogram is recommended in one year. The patient has an increased lifetime time risk of breast cancer, estimated to  be 21.5%. Recommend annual screening breast MRI in addition to mammography. The patient will be notified of these results. MRI Results (most recent):  Results from [place] encounter on 03/05/21    MRI BREAST BI W WO CONT    Narrative  EXAM:  MRI BREAST BI W WO CONT    INDICATION: High risk screening. Previous diagnosis of left breast atypical  ductal hyperplasia. COMPARISON: MRI breast on 12/8/2017 and 4/6/2016. Mammograms on 3/4/2021 and  4/14/2016. EXAM: MRI of right and left breast without and with IV contrast Gadavist .    TECHNIQUE: MRI breast without and with IV contrast. Bilateral breast MRI was  performed using a dedicated breast coil without compression with the patient in  the prone position.  Precontrast T1-weighted images with Nutrition Note:   Rec Nepro BID, consistent CHO, renal replacement, puree diet fat suppression were  obtained followed by bolus injection of 6 mL of Gadavist . Postcontrast dynamic  and high-resolution images were acquired. Axial inversion recovery imaging was  also performed. The images were analyzed using CAD analysis, enhancement curves,  digital subtraction, and 2 and 3 dimensional reconstructions. FINDINGS:    Background parenchymal enhancement: Mild. Background breast tissues have  decreased since 2016. Lymph nodes: No lymphadenopathy. Breasts: There is no suspicious focus of morphology or temporal enhancement in  either breast. Skin and nipples are within normal limits. Miscellaneous: None. Impression  No MRI evidence of malignancy. Assessment: ACR BI-RADS category  BI-RADS Assessment Category 1: Negative. Recommendation: Screening mammography in one year. Screening MRI breast in one  year if clinically indicated. A negative breast MRI examination speaks strongly against invasive cancer down  to a detection threshold of 3 to 5 mm but may not detect some lower grade or in  situ carcinomas. Therefore, routine clinical and mammographic followup are  recommended. A summary portfolio has been created in PACS.       ROS    Physical Exam    ASSESSMENT and PLAN  {ASSESSMENT/PLAN:81271}

## 2021-10-27 NOTE — PROGRESS NOTES
HISTORY OF PRESENT ILLNESS  Renata Spears is a 61 y.o. female. HPI  ESTABLISHED patient here for high risk screening follow up. She is not having any breast problems at this time. History of LEFT breast ADH, had excisional biopsy in 2009.      Breast imaging-  [facility] Results (most recent):  Results from Hospital Encounter encounter on 03/04/21     [facility] 3D MONICA W MAMMO BI SCREENING INCL CAD     Narrative  STUDY: Bilateral digital screening mammogram with 3-D tomosynthesis     INDICATION:  Screening.     COMPARISON: 2019, 2017, 2016     BREAST COMPOSITION: There are scattered areas of fibroglandular density.     FINDINGS: Bilateral digital screening mammography was performed and is  interpreted in conjunction with a computer assisted detection (CAD) system. Additionally, tomosynthesis of both breasts in the CC and MLO projections was  performed. No suspicious masses or calcifications are identified. There has been  no significant change.     Impression  BI-RADS 1: Negative. No mammographic evidence of malignancy.     RECOMMENDATIONS:  Next screening mammogram is recommended in one year. The patient has an increased lifetime time risk of breast cancer, estimated to  be 21.5%. Recommend annual screening breast MRI in addition to mammography.     The patient will be notified of these results.     MRI Results (most recent):  Results from [place] encounter on 03/05/21     MRI BREAST BI W WO CONT     Narrative  EXAM:  MRI BREAST BI W WO CONT     INDICATION: High risk screening. Previous diagnosis of left breast atypical  ductal hyperplasia.     COMPARISON: MRI breast on 12/8/2017 and 4/6/2016. Mammograms on 3/4/2021 and  4/14/2016.     EXAM: MRI of right and left breast without and with IV contrast Gadavist .     TECHNIQUE: MRI breast without and with IV contrast. Bilateral breast MRI was  performed using a dedicated breast coil without compression with the patient in  the prone position.  Precontrast T1-weighted images with fat suppression were  obtained followed by bolus injection of 6 mL of Gadavist . Postcontrast dynamic  and high-resolution images were acquired. Axial inversion recovery imaging was  also performed. The images were analyzed using CAD analysis, enhancement curves,  digital subtraction, and 2 and 3 dimensional reconstructions.     FINDINGS:     Background parenchymal enhancement: Mild. Background breast tissues have  decreased since 2016. Lymph nodes: No lymphadenopathy.     Breasts: There is no suspicious focus of morphology or temporal enhancement in  either breast. Skin and nipples are within normal limits.     Miscellaneous: None.     Impression  No MRI evidence of malignancy.     Assessment: ACR BI-RADS category  BI-RADS Assessment Category 1: Negative.     Recommendation: Screening mammography in one year. Screening MRI breast in one  year if clinically indicated.     A negative breast MRI examination speaks strongly against invasive cancer down  to a detection threshold of 3 to 5 mm but may not detect some lower grade or in  situ carcinomas. Therefore, routine clinical and mammographic followup are  recommended. A summary portfolio has been created in PACS.       Past Medical History:   Diagnosis Date    Allergic rhinitis     Cancer ([facility] [address]     skin    Hypothyroid     Unspecified hypothyroidism 12/6/2010       Past Surgical History:   Procedure Laterality Date    HX BREAST BIOPSY Left 2006    Excisional biopsy revealing ADH    HX PELVIC LAPAROSCOPY      HX WISDOM TEETH EXTRACTION      [place] BIOPSY OF BREAST, NEEDLE CORE      times two    [place] UNS ORAL SURG PROC BY REPORT         Social History     Socioeconomic History    Marital status:      Spouse name: Not on file    Number of children: Not on file    Years of education: Not on file    Highest education level: Not on file   Occupational History    Not on file   Tobacco Use    Smoking status: Never Smoker    Smokeless tobacco: Never Used   Substance and Sexual Activity    Alcohol use: Yes     Alcohol/week: 7.0 - 14.0 standard drinks     Types: 7 - 14 Glasses of wine per week    Drug use: No    Sexual activity: Yes     Partners: Male   Other Topics Concern    Not on file   Social History Narrative    Not on file     Social Determinants of Health     Financial Resource Strain:     Difficulty of Paying Living Expenses:    Food Insecurity:     Worried About Running Out of Food in the Last Year:     [address] in the Last Year:    Transportation Needs:     Lack of Transportation (Medical):  Lack of Transportation (Non-Medical):    Physical Activity:     Days of Exercise per Week:     Minutes of Exercise per Session:    Stress:     Feeling of Stress :    Social Connections:     Frequency of Communication with Friends and Family:     Frequency of Social Gatherings with Friends and Family:     Attends Mandaen Services:     Active Member of Clubs or Organizations:     Attends Club or Organization Meetings:     Marital Status:    Intimate Partner Violence:     Fear of Current or Ex-Partner:     Emotionally Abused:     Physically Abused:     Sexually Abused:        Current Outpatient Medications on File Prior to Visit   Medication Sig Dispense Refill    zolpidem (AMBIEN) 5 mg tablet TAKE 1/2 TO 1 TABLET BY MOUTH AT BEDTIME AS NEEDED FOR SLEEP 30 Tablet 1    metaxalone (SKELAXIN) 800 mg tablet Take 0.5-1 Tabs by mouth three (3) times daily as needed for Muscle Spasm(s). 30 Tab 0    fluticasone propionate (FLONASE) 50 mcg/actuation nasal spray INSTILL 2 SPRAYS IN EACH NOSTRIL DAILY (Patient taking differently: INSTILL 2 SPRAYS IN EACH NOSTRIL DAILY as needed.) 3 Bottle 3    triamcinolone acetonide (KENALOG) 0.1 % dental paste by Dental route two (2) times a day.  5 g 1    ALPRAZolam (XANAX) 0.25 mg tablet TAKE 1 TABLET BY MOUTH TWICE A DAY AS NEEDED 60 Tab 1    ARMOUR THYROID 30 mg tablet 1 1/2 in AM & 1 @ night   0    [DISCONTINUED] citalopram (CELEXA) 20 mg tablet TAKE 1 AND 1/2 TABS BY MOUTH DAILY. 135 Tab 1     No current facility-administered medications on file prior to visit. No Known Allergies    OB History    No obstetric history on file. ROS    Physical Exam  Exam conducted with a chaperone present. Cardiovascular:      Rate and Rhythm: Normal rate and regular rhythm. Heart sounds: Normal heart sounds. Pulmonary:      Breath sounds: Normal breath sounds. Chest:      Breasts: Breasts are symmetrical.         Right: Normal. No swelling, bleeding, inverted nipple, mass, nipple discharge, skin change or tenderness. Left: Normal. No swelling, bleeding, inverted nipple, mass, nipple discharge, skin change or tenderness. Lymphadenopathy:      Cervical:      Right cervical: No superficial, deep or posterior cervical adenopathy. Left cervical: No superficial, deep or posterior cervical adenopathy. Upper Body:      Right upper body: No supraclavicular or axillary adenopathy. Left upper body: No supraclavicular or axillary adenopathy. ASSESSMENT and PLAN    ICD-10-CM ICD-9-CM    1. At high risk for breast cancer  Z91.89 V49.89    2. Breast cancer screening by mammogram  Z12.31 V76.12 [facility] 3D MONICA W MAMMO BI SCREENING INCL CAD      Established pt presents for annual high risk screening for history of LEFT breast ADH, and is doing well overall. Physical exam today normal. As MRI in March was negative, will plan for MRI every other year, rather than annually. Pt to continue annual mammography. F/U in 1 year. This plan was reviewed with the patient and patient agrees. All questions were answered. Total time spent was 20 minutes.     Written by Angela Dickens, as dictated by Dr. Loreta Brunner MD.

## 2021-10-27 NOTE — PATIENT INSTRUCTIONS

## 2022-03-01 DIAGNOSIS — Z00.00 ROUTINE GENERAL MEDICAL EXAMINATION AT A HEALTH CARE FACILITY: ICD-10-CM

## 2022-03-01 RX ORDER — ZOLPIDEM TARTRATE 5 MG/1
TABLET ORAL
Qty: 30 TABLET | Refills: 1 | Status: SHIPPED | OUTPATIENT
Start: 2022-03-01 | End: 2022-06-15

## 2022-03-19 PROBLEM — Z91.89 AT RISK FOR BREAST CANCER: Status: ACTIVE | Noted: 2017-09-27

## 2022-03-25 ENCOUNTER — HOSPITAL ENCOUNTER (OUTPATIENT)
Dept: MAMMOGRAPHY | Age: 57
Discharge: HOME | End: 2022-03-25
Attending: SURGERY
Payer: COMMERCIAL

## 2022-03-25 DIAGNOSIS — Z12.31 BREAST CANCER SCREENING BY MAMMOGRAM: ICD-10-CM

## 2022-03-25 PROCEDURE — 77063 BREAST TOMOSYNTHESIS BI: CPT

## 2022-03-30 ENCOUNTER — HOSPITAL ENCOUNTER (OUTPATIENT)
Dept: MAMMOGRAPHY | Age: 57
Discharge: HOME | End: 2022-03-30
Attending: SURGERY
Payer: COMMERCIAL

## 2022-03-30 DIAGNOSIS — R92.8 ABNORMAL MAMMOGRAM: ICD-10-CM

## 2022-03-30 PROCEDURE — 76642 ULTRASOUND BREAST LIMITED: CPT

## 2022-03-30 PROCEDURE — 77061 BREAST TOMOSYNTHESIS UNI: CPT

## 2022-04-22 ENCOUNTER — TRANSCRIBE ORDER (OUTPATIENT)
Dept: SCHEDULING | Age: 57
End: 2022-04-22

## 2022-04-22 DIAGNOSIS — E83.110 HEREDITARY HEMOCHROMATOSIS (HCC): Primary | ICD-10-CM

## 2022-05-03 RX ORDER — CITALOPRAM 20 MG/1
TABLET, FILM COATED ORAL
Qty: 135 TABLET | Refills: 0 | Status: SHIPPED | OUTPATIENT
Start: 2022-05-03 | End: 2022-08-08

## 2022-05-23 ENCOUNTER — HOSPITAL ENCOUNTER (OUTPATIENT)
Dept: ULTRASOUND IMAGING | Age: 57
Discharge: HOME | End: 2022-05-23
Attending: INTERNAL MEDICINE
Payer: COMMERCIAL

## 2022-05-23 DIAGNOSIS — E83.110 HEREDITARY HEMOCHROMATOSIS (HCC): ICD-10-CM

## 2022-05-23 PROCEDURE — 76700 US EXAM ABDOM COMPLETE: CPT

## 2022-06-15 DIAGNOSIS — Z00.00 ROUTINE GENERAL MEDICAL EXAMINATION AT A HEALTH CARE FACILITY: ICD-10-CM

## 2022-06-15 RX ORDER — ZOLPIDEM TARTRATE 5 MG/1
TABLET ORAL
Qty: 30 TABLET | Refills: 1 | Status: SHIPPED | OUTPATIENT
Start: 2022-06-15 | End: 2022-09-07

## 2022-06-29 ENCOUNTER — OFFICE VISIT (OUTPATIENT)
Dept: INTERNAL MEDICINE CLINIC | Age: 57
End: 2022-06-29
Payer: COMMERCIAL

## 2022-06-29 VITALS
SYSTOLIC BLOOD PRESSURE: 121 MMHG | WEIGHT: 123.8 LBS | OXYGEN SATURATION: 98 % | HEIGHT: 63 IN | RESPIRATION RATE: 16 BRPM | BODY MASS INDEX: 21.93 KG/M2 | TEMPERATURE: 97.8 F | DIASTOLIC BLOOD PRESSURE: 79 MMHG | HEART RATE: 55 BPM

## 2022-06-29 DIAGNOSIS — E78.2 MIXED HYPERLIPIDEMIA: ICD-10-CM

## 2022-06-29 DIAGNOSIS — Z00.00 ROUTINE GENERAL MEDICAL EXAMINATION AT A HEALTH CARE FACILITY: Primary | ICD-10-CM

## 2022-06-29 DIAGNOSIS — E03.9 HYPOTHYROIDISM, UNSPECIFIED TYPE: ICD-10-CM

## 2022-06-29 DIAGNOSIS — Z00.00 WELL WOMAN EXAM (NO GYNECOLOGICAL EXAM): ICD-10-CM

## 2022-06-29 DIAGNOSIS — E83.119 HEMOCHROMATOSIS, UNSPECIFIED HEMOCHROMATOSIS TYPE: ICD-10-CM

## 2022-06-29 PROCEDURE — 99396 PREV VISIT EST AGE 40-64: CPT | Performed by: INTERNAL MEDICINE

## 2022-06-29 RX ORDER — ESTRADIOL 0.1 MG/G
CREAM VAGINAL
COMMUNITY
Start: 2022-06-16

## 2022-06-29 RX ORDER — ZOSTER VACCINE RECOMBINANT, ADJUVANTED 50 MCG/0.5
0.5 KIT INTRAMUSCULAR ONCE
Qty: 0.5 ML | Refills: 1 | Status: SHIPPED | OUTPATIENT
Start: 2022-06-29 | End: 2022-06-29

## 2022-06-30 NOTE — PROGRESS NOTES
Subjective:   61 y.o. female for Well Woman Check. Her gyne and breast care is done elsewhere by her Ob-Gyne physician. Patient Active Problem List    Diagnosis Date Noted    At risk for breast cancer 09/27/2017    Advance directive discussed with patient 10/19/2016    Breast cyst 03/25/2016    Duct ectasia of breast 03/25/2016    Atypical ductal hyperplasia of breast 01/15/2014    Hypothyroidism 12/06/2010     Current Outpatient Medications   Medication Sig Dispense Refill    estradioL (ESTRACE) 0.01 % (0.1 mg/gram) vaginal cream       varicella-zoster recombinant, PF, (Shingrix, PF,) 50 mcg/0.5 mL susr injection 0.5 mL by IntraMUSCular route once for 1 dose. 0.5 mL 1    zolpidem (AMBIEN) 5 mg tablet TAKE 1/2 TO 1 TABLET BY MOUTH AT BEDTIME AS NEEDED FOR SLEEP 30 Tablet 1    citalopram (CELEXA) 20 mg tablet TAKE 1 AND 1/2 TABS BY MOUTH DAILY. 135 Tablet 0    metaxalone (SKELAXIN) 800 mg tablet Take 0.5-1 Tabs by mouth three (3) times daily as needed for Muscle Spasm(s). 30 Tab 0    fluticasone propionate (FLONASE) 50 mcg/actuation nasal spray INSTILL 2 SPRAYS IN EACH NOSTRIL DAILY (Patient taking differently: INSTILL 2 SPRAYS IN EACH NOSTRIL DAILY as needed.) 3 Bottle 3    triamcinolone acetonide (KENALOG) 0.1 % dental paste by Dental route two (2) times a day.  5 g 1    ALPRAZolam (XANAX) 0.25 mg tablet TAKE 1 TABLET BY MOUTH TWICE A DAY AS NEEDED 60 Tab 1    ARMOUR THYROID 30 mg tablet 1 1/2 in AM & 1 @ night   0     No Known Allergies  Past Medical History:   Diagnosis Date    Allergic rhinitis     Atypical hyperplasia of breast 2006    Left    Cancer ([facility] [address]     skin    Hypothyroid     Unspecified hypothyroidism 12/6/2010     Past Surgical History:   Procedure Laterality Date    HX BREAST BIOPSY Left 2006    Excisional biopsy revealing ADH    HX PELVIC LAPAROSCOPY      HX WISDOM TEETH EXTRACTION      [place] BIOPSY OF BREAST, NEEDLE CORE      times two    [place] UNS ORAL SURG PROC BY REPORT Family History   Problem Relation Age of Onset    Thyroid Disease Mother         hypothyroid    Heart Disease Mother     Dementia Mother     Diabetes Father         small cell lung cancer    Cancer Father         Lung    No Known Problems Brother     Heart Disease Paternal Grandmother      Social History     Tobacco Use    Smoking status: Never Smoker    Smokeless tobacco: Never Used   Substance Use Topics    Alcohol use: Yes     Alcohol/week: 7.0 - 14.0 standard drinks     Types: 7 - 14 Glasses of wine per week        Lab Results   Component Value Date/Time    WBC 5.5 08/18/2020 10:53 AM    HGB 12.8 08/18/2020 10:53 AM    HCT 38.4 08/18/2020 10:53 AM    PLATELET 634 43/95/9382 10:53 AM    MCV 90 08/18/2020 10:53 AM     Lab Results   Component Value Date/Time    Cholesterol, total 265 (H) 08/18/2020 10:53 AM    HDL Cholesterol 88 08/18/2020 10:53 AM    LDL, calculated 162 (H) 08/18/2020 10:53 AM    Triglyceride 73 08/18/2020 10:53 AM    CHOL/HDL Ratio 2.9 12/09/2009 07:58 AM     Lab Results   Component Value Date/Time    ALT (SGPT) 18 08/18/2020 10:53 AM    Alk.  phosphatase 55 08/18/2020 10:53 AM    Bilirubin, total 0.4 08/18/2020 10:53 AM    Albumin 4.7 08/18/2020 10:53 AM    Protein, total 7.5 08/18/2020 10:53 AM    PLATELET 918 24/73/1882 10:53 AM     Lab Results   Component Value Date/Time    GFR est non-AA 87 08/18/2020 10:53 AM    GFR est  08/18/2020 10:53 AM    Creatinine 0.77 08/18/2020 10:53 AM    BUN 17 08/18/2020 10:53 AM    Sodium 143 08/18/2020 10:53 AM    Potassium 5.0 08/18/2020 10:53 AM    Chloride 102 08/18/2020 10:53 AM    CO2 26 08/18/2020 10:53 AM     Lab Results   Component Value Date/Time    TSH 0.132 (L) 04/23/2018 11:24 AM    Triiodothyronine (T3), free 5.0 (H) 04/23/2018 11:24 AM    T3,FREE 3.5 03/18/2010 10:21 AM    T4, Free 0.88 04/23/2018 11:24 AM      Lab Results   Component Value Date/Time    Glucose 84 08/18/2020 10:53 AM         Specific concerns today: Recent diagnosis of possible hemochromatosis. Lab revealed elevated ferritin, iron, and iron binding capacity. Ultrasound of the liver was unremarkable. She does need to see hematologist for follow-up. She is also had ongoing issues with left shoulder and arm pain particularly with exertion. She has had some palpitations as well. She has had a previous history of normal stress test in 2017. She has had blood work done recently with her osteopath and thyroid numbers were slightly over replaced. .    Review of Systems  A comprehensive review of systems was negative except for that written in the HPI. Objective:   Blood pressure 121/79, pulse (!) 55, temperature 97.8 °F (36.6 °C), temperature source Temporal, resp. rate 16, height 5' 3\" (1.6 m), weight 123 lb 12.8 oz (56.2 kg), SpO2 98 %. Physical Examination:   General appearance - alert, well appearing, and in no distress  Ears - bilateral TM's and external ear canals normal  Mouth - mucous membranes moist, pharynx normal without lesions  Neck - supple, no significant adenopathy  Lymphatics - no palpable lymphadenopathy, no hepatosplenomegaly  Chest - clear to auscultation, no wheezes, rales or rhonchi, symmetric air entry  Heart - normal rate and regular rhythm, Soft systolic murmur with a? Midsystolic click. Abdomen - soft, nontender, nondistended, no masses or organomegaly  Neurological - alert, oriented, normal speech, no focal findings or movement disorder noted  Musculoskeletal - no joint tenderness, deformity or swelling  Extremities - peripheral pulses normal, no pedal edema, no clubbing or cyanosis     Assessment/Plan:     increase physical activity, bring BP log to office visit, follow low fat diet, follow low salt diet, routine labs ordered  Diagnoses and all orders for this visit:    1. Routine general medical examination at a health care facility  -     CBC WITH AUTOMATED DIFF; Future  -     METABOLIC PANEL, COMPREHENSIVE;  Future  -     LIPID PANEL; Future  -     VITAMIN D, 25 HYDROXY; Future    2. Hypothyroidism, unspecified type-slightly over replaced. We will continue to follow-up with her osteopath regarding this. 3. Mixed hyperlipidemia-diet controlled. 4. Hemochromatosis, unspecified hemochromatosis type-we will refer to hematology. 11. Well woman exam (no gynecological exam)  Comments:  [V70.0]  6. Chest discomfort and palpitations-will refer to cardiology. I gave her the names of Dr. Jacob Terry and Susana Calderon to consider and she will make her own appointment. Other orders  -     varicella-zoster recombinant, PF, (Shingrix, PF,) 50 mcg/0.5 mL susr injection; 0.5 mL by IntraMUSCular route once for 1 dose.

## 2022-07-24 RX ORDER — METAXALONE 800 MG/1
TABLET ORAL
Qty: 30 TABLET | Refills: 0 | Status: SHIPPED | OUTPATIENT
Start: 2022-07-24

## 2022-07-27 ENCOUNTER — TELEPHONE (OUTPATIENT)
Dept: INTERNAL MEDICINE CLINIC | Age: 57
End: 2022-07-27

## 2022-08-08 RX ORDER — CITALOPRAM 20 MG/1
TABLET, FILM COATED ORAL
Qty: 135 TABLET | Refills: 0 | Status: SHIPPED | OUTPATIENT
Start: 2022-08-08

## 2022-09-06 DIAGNOSIS — Z00.00 ROUTINE GENERAL MEDICAL EXAMINATION AT A HEALTH CARE FACILITY: ICD-10-CM

## 2022-09-07 RX ORDER — ZOLPIDEM TARTRATE 5 MG/1
TABLET ORAL
Qty: 30 TABLET | Refills: 1 | Status: SHIPPED | OUTPATIENT
Start: 2022-09-07

## 2022-09-09 ENCOUNTER — OFFICE VISIT (OUTPATIENT)
Dept: CARDIOLOGY CLINIC | Age: 57
End: 2022-09-09
Payer: COMMERCIAL

## 2022-09-09 ENCOUNTER — TELEPHONE (OUTPATIENT)
Dept: CARDIOLOGY CLINIC | Age: 57
End: 2022-09-09

## 2022-09-09 VITALS
DIASTOLIC BLOOD PRESSURE: 70 MMHG | OXYGEN SATURATION: 98 % | BODY MASS INDEX: 21.97 KG/M2 | WEIGHT: 124 LBS | SYSTOLIC BLOOD PRESSURE: 100 MMHG | HEIGHT: 63 IN | RESPIRATION RATE: 16 BRPM | HEART RATE: 75 BPM

## 2022-09-09 DIAGNOSIS — Z71.89 CARDIAC RISK COUNSELING: ICD-10-CM

## 2022-09-09 DIAGNOSIS — R00.2 PALPITATIONS: Primary | ICD-10-CM

## 2022-09-09 DIAGNOSIS — M79.602 PAIN IN INFERIOR LEFT UPPER EXTREMITY: ICD-10-CM

## 2022-09-09 PROCEDURE — 93000 ELECTROCARDIOGRAM COMPLETE: CPT | Performed by: INTERNAL MEDICINE

## 2022-09-09 PROCEDURE — 99204 OFFICE O/P NEW MOD 45 MIN: CPT | Performed by: INTERNAL MEDICINE

## 2022-09-09 RX ORDER — POLYVINYL ALCOHOL 14 MG/ML
SOLUTION/ DROPS OPHTHALMIC
COMMUNITY

## 2022-09-09 RX ORDER — CALCIUM CARB/VITAMIN D3/VIT K1 500-500-40
TABLET,CHEWABLE ORAL
COMMUNITY

## 2022-09-09 RX ORDER — SULFAMETHOXAZOLE AND TRIMETHOPRIM 800; 160 MG/1; MG/1
TABLET ORAL
COMMUNITY
Start: 2022-06-29

## 2022-09-09 NOTE — TELEPHONE ENCOUNTER
Per Dr. Victoria Fast order updated to STAT due to patient upcoming trip. Patient made aware and thankful.

## 2022-09-09 NOTE — PROGRESS NOTES
Grover Peters MD, MS, [facility]            HISTORY OF PRESENT ILLNESS:    Samantha Conklin is a 61 y.o. female referred for palpitations, left arm pain. Hypothyroidism, same dose for years. One month ago, backed off a tad bc of palpitations. Palps never stopped, were going on prior to that. Last couple of weeks, more intense. Leaving country in a week. Months of palpitations, more low bernal - Dr. Stephanie Kelly - ? Murmur. Last few weeks increased palpitations. Happens during the day, in middle of night. Up in throat. At rest, no necessarily with exertion. Flutter in upper chest and neck. Can be on and off through whole day. Left arm pain - walking brings it on, can radiate down arm. Worse with incline. Tingling in hand, subtle. NO SOB. This weekend - up stairs, woozy faint, cold sweat. Late night prior, wine. Threw up. 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 Referred to Hematology - once a week phlebotomy. H/H 10/34. Discussed CCS and 48h holter. Total time spent >35 min, reviewing my prior notes, referring physician notes, other subspecialty and primary care notes, all available lab values, all available cardiac testing, all available radiology imaging, vital signs, weights, medications, potential medication interactions, family history, preparing my notes, updating diagnoses, correcting chart errors from other providers, documenting the above, discussing findings/results/testing methodologies/plan with patient, ordering tests/lab, sending prescriptions. SUMMARY:   Problem List  Date Reviewed: 10/27/2021            Codes Class Noted    At risk for breast cancer ICD-10-CM: Z91.89  ICD-9-CM: V49.89  9/27/2017        Advance directive discussed with patient ICD-10-CM: Z71.89  ICD-9-CM: V65.49  10/19/2016    Overview Signed 10/19/2016  1:44 PM by Leon Munoz LPN     End of life planning discussed with patient.   Patient states that they do not have an advance care plan at this time. Information has been provided in today's after visit summary with directions on how to contact our office to schedule an appointment with our nurse navigators for this service. Patient verbalized understanding. Breast cyst ICD-10-CM: N60.09  ICD-9-CM: 610.0  3/25/2016        Duct ectasia of breast ICD-10-CM: N60.49  ICD-9-CM: 610.4  3/25/2016        Atypical ductal hyperplasia of breast ICD-10-CM: N60.99  ICD-9-CM: 610.8  1/15/2014        Hypothyroidism ICD-10-CM: E03.9  ICD-9-CM: 244.9  12/6/2010           Current Outpatient Medications on File Prior to Visit   Medication Sig    zolpidem (AMBIEN) 5 mg tablet TAKE 1/2 TO 1 TABLET BY MOUTH AT BEDTIME AS NEEDED FOR SLEEP    citalopram (CELEXA) 20 mg tablet TAKE 1 AND 1/2 TABLETS BY MOUTH DAILY    metaxalone (SKELAXIN) 800 mg tablet TAKE 1/2-1 TABLET BY MOUTH 3 TIMES DAILY AS NEEDED FOR MUSCLE SPASMS    estradioL (ESTRACE) 0.01 % (0.1 mg/gram) vaginal cream     fluticasone propionate (FLONASE) 50 mcg/actuation nasal spray INSTILL 2 SPRAYS IN EACH NOSTRIL DAILY (Patient taking differently: INSTILL 2 SPRAYS IN EACH NOSTRIL DAILY as needed.)    triamcinolone acetonide (KENALOG) 0.1 % dental paste by Dental route two (2) times a day. ALPRAZolam (XANAX) 0.25 mg tablet TAKE 1 TABLET BY MOUTH TWICE A DAY AS NEEDED    ARMOUR THYROID 30 mg tablet 1 1/2 in AM & 1 @ night      No current facility-administered medications on file prior to visit. CARDIOLOGY STUDIES TO DATE:  No results found for this visit on 09/09/22.        CARDIAC ROS:   positive for palpitations    Past Medical History:   Diagnosis Date    Allergic rhinitis     Atypical hyperplasia of breast 2006    Left    Cancer [facility])     skin    Hypothyroid     Unspecified hypothyroidism 12/6/2010       Family History   Problem Relation Age of Onset    Thyroid Disease Mother         hypothyroid    Heart Disease Mother     Dementia Mother     Diabetes Father small cell lung cancer    Cancer Father         Lung    No Known Problems Brother     Heart Disease Paternal Grandmother        Social History     Socioeconomic History    Marital status:      Spouse name: Not on file    Number of children: Not on file    Years of education: Not on file    Highest education level: Not on file   Occupational History    Not on file   Tobacco Use    Smoking status: Never    Smokeless tobacco: Never   Substance and Sexual Activity    Alcohol use: Yes     Alcohol/week: 7.0 - 14.0 standard drinks     Types: 7 - 14 Glasses of wine per week    Drug use: No    Sexual activity: Yes     Partners: Male   Other Topics Concern    Not on file   Social History Narrative    Not on file     Social Determinants of Health     Financial Resource Strain: Not on file   Food Insecurity: Not on file   Transportation Needs: Not on file   Physical Activity: Not on file   Stress: Not on file   Social Connections: Not on file   Intimate Partner Violence: Not on file   Housing Stability: Not on file        GENERAL ROS:  A comprehensive review of systems was negative except for that written in the HPI. There were no vitals taken for this visit. There were no vitals filed for this visit.      Wt Readings from Last 3 Encounters:   06/29/22 123 lb 12.8 oz (56.2 kg)   10/27/21 124 lb (56.2 kg)   03/05/21 124 lb (56.2 kg)            BP Readings from Last 3 Encounters:   06/29/22 121/79   10/27/21 (!) 105/56   10/21/20 112/74       PHYSICAL EXAM  General appearance: alert, cooperative, no distress, appears stated age  Neck: supple, symmetrical, trachea midline, no adenopathy, thyroid: not enlarged, symmetric, no tenderness/mass/nodules, no carotid bruit, and no JVD  Lungs: clear to auscultation bilaterally  Heart: regular rate and rhythm, S1, S2 normal, no murmur, click, rub or gallop  Extremities: extremities normal, atraumatic, no cyanosis or edema    Lab Results   Component Value Date/Time Cholesterol, total 265 (H) 08/18/2020 10:53 AM    Cholesterol, total 236 (H) 04/23/2018 11:24 AM    Cholesterol, total 241 (H) 06/16/2017 09:44 AM    Cholesterol, total 220 (H) 12/09/2013 09:10 AM    Cholesterol, total 210 (H) 12/09/2009 07:58 AM    HDL Cholesterol 88 08/18/2020 10:53 AM    HDL Cholesterol 89 04/23/2018 11:24 AM    HDL Cholesterol 91 06/16/2017 09:44 AM    HDL Cholesterol 76 12/09/2013 09:10 AM    HDL Cholesterol 73 (H) 12/09/2009 07:58 AM    LDL, calculated 162 (H) 08/18/2020 10:53 AM    LDL, calculated 132 (H) 04/23/2018 11:24 AM    LDL, calculated 137 (H) 06/16/2017 09:44 AM    LDL, calculated 126 (H) 12/09/2013 09:10 AM    LDL, calculated 123.4 (H) 12/09/2009 07:58 AM    Triglyceride 73 08/18/2020 10:53 AM    Triglyceride 74 04/23/2018 11:24 AM    Triglyceride 67 06/16/2017 09:44 AM    Triglyceride 89 12/09/2013 09:10 AM    Triglyceride 68 12/09/2009 07:58 AM    CHOL/HDL Ratio 2.9 12/09/2009 07:58 AM       Lab Results   Component Value Date/Time    WBC 5.5 08/18/2020 10:53 AM    HGB 12.8 08/18/2020 10:53 AM    HCT 38.4 08/18/2020 10:53 AM    PLATELET 456 00/14/8482 10:53 AM    MCV 90 08/18/2020 10:53 AM        Lab Results   Component Value Date/Time    Cholesterol, total 265 (H) 08/18/2020 10:53 AM    Cholesterol, total 236 (H) 04/23/2018 11:24 AM    Cholesterol, total 241 (H) 06/16/2017 09:44 AM    Cholesterol, total 220 (H) 12/09/2013 09:10 AM    Cholesterol, total 210 (H) 12/09/2009 07:58 AM    HDL Cholesterol 88 08/18/2020 10:53 AM    HDL Cholesterol 89 04/23/2018 11:24 AM    HDL Cholesterol 91 06/16/2017 09:44 AM    HDL Cholesterol 76 12/09/2013 09:10 AM    HDL Cholesterol 73 (H) 12/09/2009 07:58 AM    LDL, calculated 162 (H) 08/18/2020 10:53 AM    LDL, calculated 132 (H) 04/23/2018 11:24 AM    LDL, calculated 137 (H) 06/16/2017 09:44 AM    LDL, calculated 126 (H) 12/09/2013 09:10 AM    LDL, calculated 123.4 (H) 12/09/2009 07:58 AM    Triglyceride 73 08/18/2020 10:53 AM    Triglyceride 74 04/23/2018 11:24 AM    Triglyceride 67 06/16/2017 09:44 AM    Triglyceride 89 12/09/2013 09:10 AM    Triglyceride 68 12/09/2009 07:58 AM    CHOL/HDL Ratio 2.9 12/09/2009 07:58 AM        ASSESSMENT    ICD-10-CM ICD-9-CM    1. Palpitations  R00.2 785.1           Encounter Diagnoses   Name Primary? Palpitations Yes     No orders of the defined types were placed in this encounter. [address] Ind[address] León MD  9/9/2022        [address] [place] [address]   [address], [address] [place] [address]  [phone] (F)    [address]  [address]  [phone] (P)  [phone] (F)    ATTENTION:   This medical record was transcribed using an electronic medical records/speech recognition system. Although proofread, it may and can contain electronic, spelling and other errors. Corrections may be executed at a later time. Please feel free to contact us for any clarifications as needed.

## 2022-09-09 NOTE — TELEPHONE ENCOUNTER
Patient said that the doctor wants her to get a Calcium Score test done but the facility doesn't schedule apts until after 7 days of calling. Patient said [place] would be going out of the country and need a sooner apt. Patient said the office said that they would accept a STAT apt request.Please assist.    [phone]

## 2022-09-12 ENCOUNTER — HOSPITAL ENCOUNTER (OUTPATIENT)
Dept: CT IMAGING | Age: 57
Discharge: HOME | End: 2022-09-12
Attending: INTERNAL MEDICINE
Payer: SELF-PAY

## 2022-09-12 DIAGNOSIS — Z71.89 CARDIAC RISK COUNSELING: ICD-10-CM

## 2022-09-12 DIAGNOSIS — R00.2 PALPITATIONS: ICD-10-CM

## 2022-09-12 PROCEDURE — 75571 CT HRT W/O DYE W/CA TEST: CPT

## 2022-10-07 ENCOUNTER — OFFICE VISIT (OUTPATIENT)
Dept: CARDIOLOGY CLINIC | Age: 57
End: 2022-10-07
Payer: COMMERCIAL

## 2022-10-07 VITALS
HEART RATE: 80 BPM | DIASTOLIC BLOOD PRESSURE: 72 MMHG | OXYGEN SATURATION: 99 % | BODY MASS INDEX: 22.15 KG/M2 | RESPIRATION RATE: 18 BRPM | WEIGHT: 125 LBS | SYSTOLIC BLOOD PRESSURE: 104 MMHG | HEIGHT: 63 IN

## 2022-10-07 DIAGNOSIS — E03.9 HYPOTHYROIDISM, UNSPECIFIED TYPE: Primary | ICD-10-CM

## 2022-10-07 DIAGNOSIS — R00.2 PALPITATIONS: ICD-10-CM

## 2022-10-07 PROCEDURE — 99213 OFFICE O/P EST LOW 20 MIN: CPT | Performed by: INTERNAL MEDICINE

## 2022-10-07 RX ORDER — MULTIVIT WITH MINERALS/HERBS
1 TABLET ORAL DAILY
COMMUNITY

## 2022-10-07 NOTE — PROGRESS NOTES
Tomas Espino MD, MS, [facility]            HISTORY OF PRESENT ILLNESS:    Amber Mccoy is a 61 y.o. female referred for palpitations, left arm pain. Hypothyroidism, same dose for years. One month ago, backed off a tad bc of palpitations. Palps never stopped, were going on prior to that. Last couple of weeks, more intense. Leaving country in a week. Months of palpitations, more low bernal - Dr. Yassine Rashid - ? Murmur. Last few weeks increased palpitations. Happens during the day, in middle of night. Up in throat. At rest, no necessarily with exertion. Flutter in upper chest and neck. Can be on and off through whole day. Left arm pain - walking brings it on, can radiate down arm. Worse with incline. Tingling in hand, subtle. NO SOB. This weekend - up stairs, woozy faint, cold sweat. Late night prior, wine. Threw up. 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 Referred to Hematology - once a week phlebotomy. H/H 10/34. Discussed CCS and 48h holter. CCS ZERO. 48 h holter NSR no arrhyhtmia, rare PVCs. Discussed possibility of microvascular disease - not inclined to trial medications. Total time spent >35 min, reviewing my prior notes, referring physician notes, other subspecialty and primary care notes, all available lab values, all available cardiac testing, all available radiology imaging, vital signs, weights, medications, potential medication interactions, family history, preparing my notes, updating diagnoses, correcting chart errors from other providers, documenting the above, discussing findings/results/testing methodologies/plan with patient, ordering tests/lab, sending prescriptions.        SUMMARY:   Problem List  Date Reviewed: 9/9/2022            Codes Class Noted    Palpitations ICD-10-CM: R00.2  ICD-9-CM: 785.1  10/7/2022        At risk for breast cancer ICD-10-CM: Z91.89  ICD-9-CM: V49.89  9/27/2017        Advance directive discussed with patient ICD-10-CM: Z71.89  ICD-9-CM: V65.49  10/19/2016    Overview Signed 10/19/2016  1:44 PM by Su Priest LPN     End of life planning discussed with patient. Patient states that they do not have an advance care plan at this time. Information has been provided in today's after visit summary with directions on how to contact our office to schedule an appointment with our nurse navigators for this service. Patient verbalized understanding. Breast cyst ICD-10-CM: N60.09  ICD-9-CM: 610.0  3/25/2016        Duct ectasia of breast ICD-10-CM: N60.49  ICD-9-CM: 610.4  3/25/2016        Atypical ductal hyperplasia of breast ICD-10-CM: N60.99  ICD-9-CM: 610.8  1/15/2014        Hypothyroidism ICD-10-CM: E03.9  ICD-9-CM: 244.9  12/6/2010           Current Outpatient Medications on File Prior to Visit   Medication Sig    b complex vitamins tablet Take 1 Tablet by mouth daily. omega 3-dha-epa-fish oil 1,000 mg (250 mg-750 mg)/5 mL liqd     cholecalciferol, vitamin d3, 10 mcg (400 unit) cap     polyvinyl alcohol (LIQUIFILM TEARS) 1.4 % ophthalmic solution OU    [place] Thyroid 60 mg tablet TAKE 1 AND 1/4TH TABLETS BY MOUTH ONCE DAILY    zolpidem (AMBIEN) 5 mg tablet TAKE 1/2 TO 1 TABLET BY MOUTH AT BEDTIME AS NEEDED FOR SLEEP    citalopram (CELEXA) 20 mg tablet TAKE 1 AND 1/2 TABLETS BY MOUTH DAILY    metaxalone (SKELAXIN) 800 mg tablet TAKE 1/2-1 TABLET BY MOUTH 3 TIMES DAILY AS NEEDED FOR MUSCLE SPASMS    estradioL (ESTRACE) 0.01 % (0.1 mg/gram) vaginal cream     fluticasone propionate (FLONASE) 50 mcg/actuation nasal spray INSTILL 2 SPRAYS IN EACH NOSTRIL DAILY (Patient taking differently: INSTILL 2 SPRAYS IN EACH NOSTRIL DAILY as needed.)    triamcinolone acetonide (KENALOG) 0.1 % dental paste by Dental route two (2) times a day.     ALPRAZolam (XANAX) 0.25 mg tablet TAKE 1 TABLET BY MOUTH TWICE A DAY AS NEEDED    MULTIVITAMIN 12 IV  (Patient not taking: Reported on 10/7/2022)    ARMOUR THYROID 30 mg tablet 1 1/2 in AM & 1 @ night  (Patient not taking: Reported on 10/7/2022)     No current facility-administered medications on file prior to visit. CARDIOLOGY STUDIES TO DATE:  No results found for this visit on 10/07/22. CARDIAC ROS:   positive for palpitations    Past Medical History:   Diagnosis Date    Allergic rhinitis     Atypical hyperplasia of breast 2006    Left    Cancer [facility])     skin    Hypothyroid     Unspecified hypothyroidism 12/6/2010       Family History   Problem Relation Age of Onset    Thyroid Disease Mother         hypothyroid    Heart Disease Mother     Dementia Mother     Diabetes Father         small cell lung cancer    Cancer Father         Lung    No Known Problems Brother     Heart Disease Paternal Grandmother        Social History     Socioeconomic History    Marital status:      Spouse name: Not on file    Number of children: Not on file    Years of education: Not on file    Highest education level: Not on file   Occupational History    Not on file   Tobacco Use    Smoking status: Never    Smokeless tobacco: Never   Substance and Sexual Activity    Alcohol use: Yes     Alcohol/week: 5.0 standard drinks     Types: 5 Glasses of wine per week    Drug use: No    Sexual activity: Yes     Partners: Male   Other Topics Concern    Not on file   Social History Narrative    Not on file     Social Determinants of Health     Financial Resource Strain: Not on file   Food Insecurity: Not on file   Transportation Needs: Not on file   Physical Activity: Not on file   Stress: Not on file   Social Connections: Not on file   Intimate Partner Violence: Not on file   Housing Stability: Not on file        GENERAL ROS:  A comprehensive review of systems was negative except for that written in the HPI.     Visit Vitals  /72 (BP 1 Location: Right arm, BP Patient Position: Sitting, BP Cuff Size: Adult)   Pulse 80   Resp 18   Ht 5' 3\" (1.6 m)   Wt 125 lb (56.7 kg)   SpO2 99% BMI 22.14 kg/m²     Vitals:    10/07/22 1334   BP: 104/72   Pulse: 80   Resp: 18   SpO2: 99%   Weight: 125 lb (56.7 kg)   Height: 5' 3\" (1.6 m)        Wt Readings from Last 3 Encounters:   10/07/22 125 lb (56.7 kg)   09/09/22 124 lb (56.2 kg)   06/29/22 123 lb 12.8 oz (56.2 kg)            BP Readings from Last 3 Encounters:   10/07/22 104/72   09/09/22 100/70   06/29/22 121/79       PHYSICAL EXAM  General appearance: alert, cooperative, no distress, appears stated age  Neck: supple, symmetrical, trachea midline, no adenopathy, thyroid: not enlarged, symmetric, no tenderness/mass/nodules, no carotid bruit, and no JVD  Lungs: clear to auscultation bilaterally  Heart: regular rate and rhythm, S1, S2 normal, no murmur, click, rub or gallop  Extremities: extremities normal, atraumatic, no cyanosis or edema    Lab Results   Component Value Date/Time    Cholesterol, total 265 (H) 08/18/2020 10:53 AM    Cholesterol, total 236 (H) 04/23/2018 11:24 AM    Cholesterol, total 241 (H) 06/16/2017 09:44 AM    Cholesterol, total 220 (H) 12/09/2013 09:10 AM    Cholesterol, total 210 (H) 12/09/2009 07:58 AM    HDL Cholesterol 88 08/18/2020 10:53 AM    HDL Cholesterol 89 04/23/2018 11:24 AM    HDL Cholesterol 91 06/16/2017 09:44 AM    HDL Cholesterol 76 12/09/2013 09:10 AM    HDL Cholesterol 73 (H) 12/09/2009 07:58 AM    LDL, calculated 162 (H) 08/18/2020 10:53 AM    LDL, calculated 132 (H) 04/23/2018 11:24 AM    LDL, calculated 137 (H) 06/16/2017 09:44 AM    LDL, calculated 126 (H) 12/09/2013 09:10 AM    LDL, calculated 123.4 (H) 12/09/2009 07:58 AM    Triglyceride 73 08/18/2020 10:53 AM    Triglyceride 74 04/23/2018 11:24 AM    Triglyceride 67 06/16/2017 09:44 AM    Triglyceride 89 12/09/2013 09:10 AM    Triglyceride 68 12/09/2009 07:58 AM    CHOL/HDL Ratio 2.9 12/09/2009 07:58 AM       Lab Results   Component Value Date/Time    WBC 5.5 08/18/2020 10:53 AM    HGB 12.8 08/18/2020 10:53 AM    HCT 38.4 08/18/2020 10:53 AM    PLATELET 275 08/18/2020 10:53 AM    MCV 90 08/18/2020 10:53 AM        Lab Results   Component Value Date/Time    Cholesterol, total 265 (H) 08/18/2020 10:53 AM    Cholesterol, total 236 (H) 04/23/2018 11:24 AM    Cholesterol, total 241 (H) 06/16/2017 09:44 AM    Cholesterol, total 220 (H) 12/09/2013 09:10 AM    Cholesterol, total 210 (H) 12/09/2009 07:58 AM    HDL Cholesterol 88 08/18/2020 10:53 AM    HDL Cholesterol 89 04/23/2018 11:24 AM    HDL Cholesterol 91 06/16/2017 09:44 AM    HDL Cholesterol 76 12/09/2013 09:10 AM    HDL Cholesterol 73 (H) 12/09/2009 07:58 AM    LDL, calculated 162 (H) 08/18/2020 10:53 AM    LDL, calculated 132 (H) 04/23/2018 11:24 AM    LDL, calculated 137 (H) 06/16/2017 09:44 AM    LDL, calculated 126 (H) 12/09/2013 09:10 AM    LDL, calculated 123.4 (H) 12/09/2009 07:58 AM    Triglyceride 73 08/18/2020 10:53 AM    Triglyceride 74 04/23/2018 11:24 AM    Triglyceride 67 06/16/2017 09:44 AM    Triglyceride 89 12/09/2013 09:10 AM    Triglyceride 68 12/09/2009 07:58 AM    CHOL/HDL Ratio 2.9 12/09/2009 07:58 AM        ASSESSMENT    ICD-10-CM ICD-9-CM    1. Hypothyroidism, unspecified type  E03.9 244.9       2. Palpitations  R00.2 785.1             Encounter Diagnoses   Name Primary? Hypothyroidism, unspecified type Yes    Palpitations        Orders Placed This Encounter    b complex vitamins tablet       Plan      Follow-up and Dispositions    Return if symptoms worsen or fail to improve. Skip Hernandez MD  10/7/2022        [address]   [address]  [address]  [phone] (F)    [address] [place] [address] [place]  [phone] (P)  [phone] (F)    ATTENTION:   This medical record was transcribed using an electronic medical records/speech recognition system. Although proofread, it may and can contain electronic, spelling and other errors. Corrections may be executed at a later time.   Please feel free to contact us for any clarifications as needed.

## 2023-01-12 RX ORDER — CITALOPRAM 20 MG/1
TABLET, FILM COATED ORAL
Qty: 135 TABLET | Refills: 0 | Status: SHIPPED | OUTPATIENT
Start: 2023-01-12

## 2023-01-27 ENCOUNTER — TELEPHONE (OUTPATIENT)
Dept: INTERNAL MEDICINE CLINIC | Age: 58
End: 2023-01-27

## 2023-01-27 NOTE — TELEPHONE ENCOUNTER
Spoke with pt - she states 2 days ago while on vacation she was going down water slide on tube. At the end she crashed into metal grate. She has large bruising on right side of back. She states bruising is approx 8 inches wide and 6 inches long. She has been using ice. It is feeling tender and sore. She wanted to know if she should be seen in office or go to ER? Advised pt MD out of office today. We had no appts available. Advised her she should go to ER for further evaluation. She states she did not feel like it was bad enough to go there. Scheduled her to see Dr Elder Prakash on 1/30/23 at 2 pm. Advised her if symptoms worsen or fail to improve prior to her appt to go to ER. Pt verbalizes understanding.

## 2023-01-27 NOTE — TELEPHONE ENCOUNTER
Reason for call:  patient had a significant injury to her hip and Rt side on 01/25/23 and is experiencing severe bruising, pain, and swelling.   She is unsure as to whether she needs to go to urgent care, ER, or should be seen in the office  Please call and advise    Is this a new problem: yes     Date of last appointment:  6/29/2022     Can we respond via kooldinert: no    Best call back number:    Robert Leon (Self) [phone] (Mobile)  [phone] (Mobile) Remove

## 2023-01-30 ENCOUNTER — OFFICE VISIT (OUTPATIENT)
Dept: INTERNAL MEDICINE CLINIC | Age: 58
End: 2023-01-30
Payer: COMMERCIAL

## 2023-01-30 VITALS
SYSTOLIC BLOOD PRESSURE: 129 MMHG | TEMPERATURE: 97.8 F | WEIGHT: 127 LBS | HEART RATE: 101 BPM | DIASTOLIC BLOOD PRESSURE: 81 MMHG | RESPIRATION RATE: 12 BRPM | BODY MASS INDEX: 22.5 KG/M2 | HEIGHT: 63 IN | OXYGEN SATURATION: 97 %

## 2023-01-30 DIAGNOSIS — T14.8XXA HEMATOMA: Primary | ICD-10-CM

## 2023-01-30 DIAGNOSIS — Z00.00 ROUTINE GENERAL MEDICAL EXAMINATION AT A HEALTH CARE FACILITY: ICD-10-CM

## 2023-01-30 PROCEDURE — 99213 OFFICE O/P EST LOW 20 MIN: CPT | Performed by: INTERNAL MEDICINE

## 2023-01-30 RX ORDER — GABAPENTIN 300 MG/1
CAPSULE ORAL
COMMUNITY
Start: 2022-10-27 | End: 2023-01-30 | Stop reason: ALTCHOICE

## 2023-01-30 RX ORDER — ZOLPIDEM TARTRATE 5 MG/1
TABLET ORAL
Qty: 30 TABLET | Refills: 1 | Status: SHIPPED | OUTPATIENT
Start: 2023-01-30

## 2023-01-31 NOTE — PROGRESS NOTES
HPI:  Maycol Maravilla is a 61y.o. year old female who is here for a pain and large area of bruising on her right buttocks. She was in a water slide in the [facility] last Wednesday and fell off of her tube onto a grate at the bottom of the slide. She immediately had severe pain that progressed over the course of that evening. She noticed a large bruise over her buttocks. After having 2 Aleve and 1 drink she went to sit by the pool and had an episode of near syncope. Since then the bruising has spread. Her discomfort has improved with using ice over the weekend. She has taken a couple of Aleve with success. Past Medical History:   Diagnosis Date    Allergic rhinitis     Atypical hyperplasia of breast 2006    Left    Cancer [facility])     skin    Hypothyroid     Unspecified hypothyroidism 12/6/2010       Past Surgical History:   Procedure Laterality Date    HX BREAST BIOPSY Left 2006    Excisional biopsy revealing ADH    HX PELVIC LAPAROSCOPY      HX WISDOM TEETH EXTRACTION      HI BX BREAST NEEDLE CORE W/O IMAGING GUIDANCE SPX      times two    HI UNS ORAL SURG PROC BY REPORT         Prior to Admission medications    Medication Sig Start Date End Date Taking? Authorizing Provider   zolpidem (AMBIEN) 5 mg tablet TAKE 1/2 TO 1 TABLET BY MOUTH AT BEDTIME AS NEEDED FOR SLEEP 1/30/23  Yes Jevon Arvizu III, MD   citalopram (CELEXA) 20 mg tablet TAKE 1 AND 1/2 TABLETS DAILY BY MOUTH 1/12/23  Yes Rosalva Schrader MD   b complex vitamins tablet Take 1 Tablet by mouth daily.    Yes Provider, Historical   cholecalciferol, vitamin d3, 10 mcg (400 unit) cap    Yes Provider, Historical   Thornton Thyroid 60 mg tablet TAKE 1 AND 1/4TH TABLETS BY MOUTH ONCE DAILY 6/29/22  Yes Provider, Historical   metaxalone (SKELAXIN) 800 mg tablet TAKE 1/2-1 TABLET BY MOUTH 3 TIMES DAILY AS NEEDED FOR MUSCLE SPASMS 7/24/22  Yes Jevon Arvizu III, MD   estradioL (ESTRACE) 0.01 % (0.1 mg/gram) vaginal cream  6/16/22  Yes Provider, Historical   fluticasone propionate (FLONASE) 50 mcg/actuation nasal spray INSTILL 2 SPRAYS IN EACH NOSTRIL DAILY  Patient taking differently: INSTILL 2 SPRAYS IN EACH NOSTRIL DAILY as needed. 10/16/19  Yes Rosalva Schrader MD   triamcinolone acetonide (KENALOG) 0.1 % dental paste by Dental route two (2) times a day. 7/31/19  Yes Rosalva Schrader MD   ALPRAZolam Glendia Reeve) 0.25 mg tablet TAKE 1 TABLET BY MOUTH TWICE A DAY AS NEEDED 10/19/16  Yes Jevon Arvizu III, MD   gabapentin (NEURONTIN) 300 mg capsule TAKE 1 CAPSULE BY MOUTH EVERY DAY AT NIGHT  Patient not taking: Reported on 1/30/2023 10/27/22 1/30/23  Provider, Historical   MULTIVITAMIN 12 IV   1/30/23  Provider, Historical   omega 3-dha-epa-fish oil 1,000 mg (250 mg-750 mg)/5 mL liqd   1/30/23  Provider, Historical   polyvinyl alcohol (LIQUIFILM TEARS) 1.4 % ophthalmic solution OU  Patient not taking: Reported on 1/30/2023 1/30/23  Provider, Historical   zolpidem (AMBIEN) 5 mg tablet TAKE 1/2 TO 1 TABLET BY MOUTH AT BEDTIME AS NEEDED FOR SLEEP 9/7/22 1/30/23  Rosalva Schrader MD   ARMOUR THYROID 30 mg tablet 1 1/2 in AM & 1 @ night   Patient not taking: No sig reported 1/17/16 1/30/23  Provider, Historical       Social History     Socioeconomic History    Marital status:      Spouse name: Not on file    Number of children: Not on file    Years of education: Not on file    Highest education level: Not on file   Occupational History    Not on file   Tobacco Use    Smoking status: Never    Smokeless tobacco: Never   Substance and Sexual Activity    Alcohol use:  Yes     Alcohol/week: 5.0 standard drinks     Types: 5 Glasses of wine per week    Drug use: No    Sexual activity: Yes     Partners: Male   Other Topics Concern    Not on file   Social History Narrative    Not on file     Social Determinants of Health     Financial Resource Strain: Not on file   Food Insecurity: Not on file   Transportation Needs: Not on file   Physical Activity: Not on file   Stress: Not on file   Social Connections: Not on file   Intimate Partner Violence: Not on file   Housing Stability: Not on file          ROS  Per HPI    Visit Vitals  /81   Pulse (!) 101   Temp 97.8 °F (36.6 °C) (Temporal)   Resp 12   Ht 5' 3\" (1.6 m)   Wt 127 lb (57.6 kg)   SpO2 97%   BMI 22.50 kg/m²         Physical Exam   Physical Examination: General appearance - alert, well appearing, and in no distress  There is a large bruised area with an apparent hematoma in the center just to the right of her right SI joint. Tender to the touch with extensive bruising surrounding it and down into the lower. Assessment/Plan:  Diagnoses and all orders for this visit:    1. Hematoma-reassured. Will use ice and Aleve. Will let me know if not improving over the next week or so. 2. Routine general medical examination at a health care facility  -     zolpidem (AMBIEN) 5 mg tablet; TAKE 1/2 TO 1 TABLET BY MOUTH AT BEDTIME AS NEEDED FOR SLEEP           Advised her to call back or return to office if symptoms worsen/change/persist.  Discussed expected course/resolution/complications of diagnosis in detail with patient. Medication risks/benefits/costs/interactions/alternatives discussed with patient. She was given an after visit summary which includes diagnoses, current medications, & vitals. She expressed understanding with the diagnosis and plan.

## 2023-06-21 DIAGNOSIS — Z00.00 ENCOUNTER FOR GENERAL ADULT MEDICAL EXAMINATION WITHOUT ABNORMAL FINDINGS: ICD-10-CM

## 2023-06-22 ENCOUNTER — PATIENT MESSAGE (OUTPATIENT)
Age: 58
End: 2023-06-22

## 2023-06-22 DIAGNOSIS — G47.9 SLEEP DISORDER: Primary | ICD-10-CM

## 2023-06-22 RX ORDER — ZOLPIDEM TARTRATE 5 MG/1
TABLET ORAL
Qty: 30 TABLET | Refills: 0 | Status: SHIPPED | OUTPATIENT
Start: 2023-06-22 | End: 2023-07-22

## 2023-06-22 NOTE — TELEPHONE ENCOUNTER
From: Amarilis Barrera  To: Dr. Roge Vides: 6/22/2023 1:10 PM EDT  Subject: Zolpidem    Would you please send a refill of my Zolpidem RX? [facility] sent a request yesterday but not sure it got through bc you guys are usually very prompt to reply to them but they haven't gotten a response (just checked). I am traveling this weekend and will need some.  Thank You:)

## 2023-06-28 RX ORDER — ZOLPIDEM TARTRATE 5 MG/1
TABLET ORAL
Qty: 30 TABLET | Refills: 1 | OUTPATIENT
Start: 2023-06-28

## 2023-08-15 DIAGNOSIS — G47.9 SLEEP DISORDER: ICD-10-CM

## 2023-08-15 RX ORDER — ZOLPIDEM TARTRATE 5 MG/1
TABLET ORAL
Qty: 30 TABLET | Refills: 0 | Status: SHIPPED | OUTPATIENT
Start: 2023-08-15 | End: 2023-09-15

## 2023-10-17 ENCOUNTER — OFFICE VISIT (OUTPATIENT)
Age: 58
End: 2023-10-17
Payer: COMMERCIAL

## 2023-10-17 VITALS
HEIGHT: 63 IN | BODY MASS INDEX: 22.11 KG/M2 | HEART RATE: 84 BPM | TEMPERATURE: 97.8 F | DIASTOLIC BLOOD PRESSURE: 80 MMHG | RESPIRATION RATE: 15 BRPM | SYSTOLIC BLOOD PRESSURE: 119 MMHG | WEIGHT: 124.8 LBS | OXYGEN SATURATION: 97 %

## 2023-10-17 DIAGNOSIS — E78.2 MIXED HYPERLIPIDEMIA: ICD-10-CM

## 2023-10-17 DIAGNOSIS — Z00.00 ENCOUNTER FOR WELL ADULT EXAM WITHOUT ABNORMAL FINDINGS: Primary | ICD-10-CM

## 2023-10-17 DIAGNOSIS — E03.9 ACQUIRED HYPOTHYROIDISM: ICD-10-CM

## 2023-10-17 DIAGNOSIS — Z12.31 SCREENING MAMMOGRAM FOR BREAST CANCER: ICD-10-CM

## 2023-10-17 DIAGNOSIS — G47.9 SLEEP DISORDER: ICD-10-CM

## 2023-10-17 PROCEDURE — 99396 PREV VISIT EST AGE 40-64: CPT | Performed by: INTERNAL MEDICINE

## 2023-10-17 RX ORDER — ZOLPIDEM TARTRATE 5 MG/1
TABLET ORAL
Qty: 30 TABLET | Refills: 0 | COMMUNITY
Start: 2023-10-17

## 2023-10-17 RX ORDER — CITALOPRAM 20 MG/1
TABLET ORAL
Qty: 135 TABLET | Refills: 3 | Status: SHIPPED | OUTPATIENT
Start: 2023-10-17

## 2023-10-17 RX ORDER — METHOCARBAMOL 500 MG/1
500 TABLET, FILM COATED ORAL 3 TIMES DAILY PRN
Qty: 60 TABLET | Refills: 1 | Status: SHIPPED | OUTPATIENT
Start: 2023-10-17 | End: 2023-11-26

## 2023-10-17 SDOH — ECONOMIC STABILITY: FOOD INSECURITY: WITHIN THE PAST 12 MONTHS, THE FOOD YOU BOUGHT JUST DIDN'T LAST AND YOU DIDN'T HAVE MONEY TO GET MORE.: NEVER TRUE

## 2023-10-17 SDOH — ECONOMIC STABILITY: FOOD INSECURITY: WITHIN THE PAST 12 MONTHS, YOU WORRIED THAT YOUR FOOD WOULD RUN OUT BEFORE YOU GOT MONEY TO BUY MORE.: NEVER TRUE

## 2023-10-17 SDOH — ECONOMIC STABILITY: INCOME INSECURITY: HOW HARD IS IT FOR YOU TO PAY FOR THE VERY BASICS LIKE FOOD, HOUSING, MEDICAL CARE, AND HEATING?: NOT HARD AT ALL

## 2023-10-17 SDOH — ECONOMIC STABILITY: HOUSING INSECURITY
IN THE LAST 12 MONTHS, WAS THERE A TIME WHEN YOU DID NOT HAVE A STEADY PLACE TO SLEEP OR SLEPT IN A SHELTER (INCLUDING NOW)?: NO

## 2023-10-17 NOTE — PROGRESS NOTES
moist, pharynx normal without lesions  Neck - supple, no significant adenopathy  Lymphatics - no palpable lymphadenopathy, no hepatosplenomegaly  Chest - clear to auscultation, no wheezes, rales or rhonchi, symmetric air entry  Heart - normal rate, regular rhythm, normal S1, S2, no murmurs, rubs, clicks or gallops  Abdomen - soft, nontender, nondistended, no masses or organomegaly  Neurological - alert, oriented, normal speech, no focal findings or movement disorder noted  Musculoskeletal - no joint tenderness, deformity or swelling. There is tenderness across the right trapezius. Extremities - peripheral pulses normal, no pedal edema, no clubbing or cyanosis    Assessment   Plan   1. Encounter for well adult exam without abnormal findings  2. Sleep disorder-agree with plans for dental device. 3. Screening mammogram for breast cancer  -     [facility] DIGITAL SCREEN W OR WO CAD BILATERAL; Future  4. Acquired hypothyroidism-euthyroid by recent labs. 5. Mixed hyperlipidemia-diet controlled. 6.  Muscle tension headaches-we will add muscle relaxer at night and she will let me know if that helps with both sleep and her neck pain.          Personalized Preventive Plan   Current Health Maintenance Status  Immunization History   Administered Date(s) Administered    COVID-19, MODERNA BLUE border, Primary or Immunocompromised, (age 12y+), IM, 100 mcg/0.5mL 02/17/2021, 03/17/2021    COVID-19, MODERNA Bivalent, (age 12y+), IM, 50 mcg/0.5 mL 09/07/2022    COVID-19, PFIZER PURPLE top, DILUTE for use, (age 15 y+), 30mcg/0.3mL 10/26/2021    Hep A, HAVRIX, VAQTA, (age 19y+), IM, 1mL 04/04/2019    Influenza Virus Vaccine 11/28/2012, 10/19/2016, 12/01/2017, 10/01/2018, 11/02/2018, 10/16/2019, 11/12/2021    Influenza, FLUARIX, FLULAVAL, FLUZONE (age 10 mo+) AND AFLURIA, (age 1 y+), PF, 0.5mL 10/19/2016, 11/02/2018, 10/16/2019, 11/12/2021    TDaP, ADACEL (age 6y-58y), BOOSTRIX (age 10y+), IM, 0.5mL 10/19/2016        Health Maintenance

## 2023-10-24 DIAGNOSIS — N60.92 UNSPECIFIED BENIGN MAMMARY DYSPLASIA OF LEFT BREAST: ICD-10-CM

## 2023-10-24 DIAGNOSIS — Z91.89 OTHER SPECIFIED PERSONAL RISK FACTORS, NOT ELSEWHERE CLASSIFIED: Primary | ICD-10-CM

## 2023-10-24 DIAGNOSIS — G47.9 SLEEP DISORDER: ICD-10-CM

## 2023-10-24 RX ORDER — ZOLPIDEM TARTRATE 5 MG/1
5 TABLET ORAL NIGHTLY PRN
Qty: 90 TABLET | Refills: 0 | Status: SHIPPED | OUTPATIENT
Start: 2023-10-24 | End: 2024-01-22

## 2023-11-09 ENCOUNTER — HOSPITAL ENCOUNTER (OUTPATIENT)
Age: 58
Discharge: HOME | End: 2023-11-09
Payer: COMMERCIAL

## 2023-11-09 ENCOUNTER — HOSPITAL ENCOUNTER (OUTPATIENT)
Age: 58
End: 2023-11-09
Payer: COMMERCIAL

## 2023-11-09 VITALS — BODY MASS INDEX: 21.97 KG/M2 | WEIGHT: 124 LBS | HEIGHT: 63 IN

## 2023-11-09 DIAGNOSIS — N60.92 UNSPECIFIED BENIGN MAMMARY DYSPLASIA OF LEFT BREAST: ICD-10-CM

## 2023-11-09 DIAGNOSIS — Z12.31 VISIT FOR SCREENING MAMMOGRAM: ICD-10-CM

## 2023-11-09 DIAGNOSIS — Z91.89 OTHER SPECIFIED PERSONAL RISK FACTORS, NOT ELSEWHERE CLASSIFIED: ICD-10-CM

## 2023-11-09 PROCEDURE — 6360000004 HC RX CONTRAST MEDICATION: Performed by: DENTIST

## 2023-11-09 PROCEDURE — A9585 GADOBUTROL INJECTION: HCPCS | Performed by: DENTIST

## 2023-11-09 PROCEDURE — C8908 MRI W/O FOL W/CONT, BREAST,: HCPCS

## 2023-11-09 PROCEDURE — 77063 BREAST TOMOSYNTHESIS BI: CPT

## 2023-11-09 RX ORDER — GADOBUTROL 604.72 MG/ML
5.5 INJECTION INTRAVENOUS
Status: COMPLETED | OUTPATIENT
Start: 2023-11-09 | End: 2023-11-09

## 2023-11-09 RX ADMIN — GADOBUTROL 5.5 ML: 604.72 INJECTION INTRAVENOUS at 12:23

## 2023-11-15 ENCOUNTER — TELEPHONE (OUTPATIENT)
Age: 58
End: 2023-11-15

## 2024-02-14 ENCOUNTER — HOSPITAL ENCOUNTER (OUTPATIENT)
Age: 59
Discharge: HOME | End: 2024-02-17
Payer: COMMERCIAL

## 2024-02-14 DIAGNOSIS — E83.110 PIGMENT CIRRHOSIS (HCC): ICD-10-CM

## 2024-02-14 PROCEDURE — 76700 US EXAM ABDOM COMPLETE: CPT

## 2024-02-15 DIAGNOSIS — G47.9 SLEEP DISORDER: ICD-10-CM

## 2024-02-15 RX ORDER — ZOLPIDEM TARTRATE 5 MG/1
TABLET ORAL
Qty: 90 TABLET | Refills: 0 | Status: SHIPPED | OUTPATIENT
Start: 2024-02-15 | End: 2024-05-14

## 2024-02-15 NOTE — TELEPHONE ENCOUNTER
Chief Complaint   Patient presents with    Medication Refill     Last Appointment with Dr. Blane Ocampo:  10/17/2023   No future appointments.

## 2024-08-11 DIAGNOSIS — G47.9 SLEEP DISORDER: ICD-10-CM

## 2024-08-11 RX ORDER — ZOLPIDEM TARTRATE 5 MG/1
TABLET ORAL
Qty: 90 TABLET | Refills: 0 | Status: SHIPPED | OUTPATIENT
Start: 2024-08-11 | End: 2024-11-11

## 2024-11-11 RX ORDER — CITALOPRAM HYDROBROMIDE 20 MG/1
TABLET ORAL
Qty: 135 TABLET | Refills: 3 | Status: SHIPPED | OUTPATIENT
Start: 2024-11-11

## 2024-12-13 ENCOUNTER — TELEMEDICINE (OUTPATIENT)
Age: 59
End: 2024-12-13
Payer: COMMERCIAL

## 2024-12-13 DIAGNOSIS — M62.830 BACK MUSCLE SPASM: ICD-10-CM

## 2024-12-13 DIAGNOSIS — U07.1 COVID-19: Primary | ICD-10-CM

## 2024-12-13 PROCEDURE — 99213 OFFICE O/P EST LOW 20 MIN: CPT | Performed by: NURSE PRACTITIONER

## 2024-12-13 RX ORDER — METAXALONE 800 MG/1
TABLET ORAL
Qty: 30 TABLET | Refills: 0 | Status: SHIPPED | OUTPATIENT
Start: 2024-12-13

## 2024-12-13 RX ORDER — METAXALONE 800 MG/1
TABLET ORAL
Status: CANCELLED | OUTPATIENT
Start: 2024-12-13

## 2024-12-13 ASSESSMENT — PATIENT HEALTH QUESTIONNAIRE - PHQ9
SUM OF ALL RESPONSES TO PHQ QUESTIONS 1-9: 0
1. LITTLE INTEREST OR PLEASURE IN DOING THINGS: NOT AT ALL
SUM OF ALL RESPONSES TO PHQ QUESTIONS 1-9: 0
SUM OF ALL RESPONSES TO PHQ9 QUESTIONS 1 & 2: 0
2. FEELING DOWN, DEPRESSED OR HOPELESS: NOT AT ALL
SUM OF ALL RESPONSES TO PHQ QUESTIONS 1-9: 0
SUM OF ALL RESPONSES TO PHQ QUESTIONS 1-9: 0

## 2024-12-13 NOTE — PROGRESS NOTES
(ESTRACE) 0.01 % (0.1 mg/gram) vaginal cream 6/16/22  Yes Automatic Reconciliation, Ar   fluticasone (FLONASE) 50 MCG/ACT nasal spray INSTILL 2 SPRAYS IN EACH NOSTRIL DAILY 10/16/19  Yes Automatic Reconciliation, Ar   thyroid (ARMOUR THYROID) 60 MG tablet TAKE 1 AND 1/4TH TABLETS BY MOUTH ONCE DAILY 6/29/22  Yes Automatic Reconciliation, Ar   triamcinolone acetonide (KENALOG) 0.1 % paste Place onto teeth 2 times daily 7/31/19  Yes Automatic Reconciliation, Ar       Patient Active Problem List   Diagnosis    Advance directive discussed with patient    At risk for breast cancer    Duct ectasia of breast    Atypical ductal hyperplasia of breast    Hypothyroidism    Breast cyst    Palpitations     Patient Active Problem List    Diagnosis Date Noted    Palpitations 10/07/2022    At risk for breast cancer 09/27/2017    Advance directive discussed with patient 10/19/2016    Duct ectasia of breast 03/25/2016    Breast cyst 03/25/2016    Atypical ductal hyperplasia of breast 01/15/2014    Hypothyroidism 12/06/2010     Current Outpatient Medications   Medication Sig Dispense Refill    nirmatrelvir/ritonavir 300/100 (PAXLOVID, 300/100,) 20 x 150 MG & 10 x 100MG TBPK Take 3 tablets (two 150 mg nirmatrelvir and one 100 mg ritonavir tablets) by mouth every 12 hours for 5 days. 30 tablet 0    metaxalone (SKELAXIN) 800 MG tablet TAKE 1/2-1 TABLET BY MOUTH 3 TIMES DAILY AS NEEDED FOR MUSCLE SPASMS 30 tablet 0    citalopram (CELEXA) 20 MG tablet TAKE 1 AND 1/2 TABLETS BY MOUTH DAILY 135 tablet 3    ALPRAZolam (XANAX) 0.25 MG tablet TAKE 1 TABLET BY MOUTH TWICE A DAY AS NEEDED      Cholecalciferol (VITAMIN D) 10 MCG (400 UNIT) CAPS Capsule ceived the following from [facility]: Outside name: cholecalciferol, vitamin d3, 10 mcg (400 unit) cap      estradiol (ESTRACE) 0.1 MG/GM vaginal cream ceived the following from [facility]: Outside name: estradioL (ESTRACE) 0.01 % (0.1 mg/gram) vaginal cream

## 2024-12-23 ENCOUNTER — OFFICE VISIT (OUTPATIENT)
Age: 59
End: 2024-12-23
Payer: COMMERCIAL

## 2024-12-23 VITALS
HEIGHT: 63 IN | SYSTOLIC BLOOD PRESSURE: 129 MMHG | HEART RATE: 83 BPM | TEMPERATURE: 98.1 F | DIASTOLIC BLOOD PRESSURE: 84 MMHG | RESPIRATION RATE: 15 BRPM | WEIGHT: 124.8 LBS | OXYGEN SATURATION: 97 % | BODY MASS INDEX: 22.11 KG/M2

## 2024-12-23 DIAGNOSIS — N60.99 ATYPICAL DUCTAL HYPERPLASIA OF BREAST: ICD-10-CM

## 2024-12-23 DIAGNOSIS — F41.1 GAD (GENERALIZED ANXIETY DISORDER): ICD-10-CM

## 2024-12-23 DIAGNOSIS — E78.2 MIXED HYPERLIPIDEMIA: ICD-10-CM

## 2024-12-23 DIAGNOSIS — M62.830 BACK MUSCLE SPASM: ICD-10-CM

## 2024-12-23 DIAGNOSIS — M77.40 METATARSALGIA, UNSPECIFIED LATERALITY: ICD-10-CM

## 2024-12-23 DIAGNOSIS — I73.00 RAYNAUD'S PHENOMENON WITHOUT GANGRENE: Primary | ICD-10-CM

## 2024-12-23 DIAGNOSIS — E03.9 ACQUIRED HYPOTHYROIDISM: ICD-10-CM

## 2024-12-23 PROCEDURE — 99214 OFFICE O/P EST MOD 30 MIN: CPT | Performed by: INTERNAL MEDICINE

## 2024-12-23 RX ORDER — TRIAMCINOLONE ACETONIDE 0.1 %
PASTE (GRAM) DENTAL 2 TIMES DAILY
Qty: 5 G | Refills: 1 | Status: SHIPPED | OUTPATIENT
Start: 2024-12-23

## 2024-12-23 SDOH — ECONOMIC STABILITY: FOOD INSECURITY: WITHIN THE PAST 12 MONTHS, YOU WORRIED THAT YOUR FOOD WOULD RUN OUT BEFORE YOU GOT MONEY TO BUY MORE.: NEVER TRUE

## 2024-12-23 SDOH — ECONOMIC STABILITY: INCOME INSECURITY: HOW HARD IS IT FOR YOU TO PAY FOR THE VERY BASICS LIKE FOOD, HOUSING, MEDICAL CARE, AND HEATING?: NOT HARD AT ALL

## 2024-12-23 SDOH — ECONOMIC STABILITY: FOOD INSECURITY: WITHIN THE PAST 12 MONTHS, THE FOOD YOU BOUGHT JUST DIDN'T LAST AND YOU DIDN'T HAVE MONEY TO GET MORE.: NEVER TRUE

## 2024-12-23 NOTE — PROGRESS NOTES
No follow-up provider specified.       Advised her to call back or return to office if symptoms worsen/change/persist.  Discussed expected course/resolution/complications of diagnosis in detail with patient.    Medication risks/benefits/costs/interactions/alternatives discussed with patient.  She was given an after visit summary which includes diagnoses, current medications, & vitals.  She expressed understanding with the diagnosis and plan.

## 2025-02-06 DIAGNOSIS — G47.9 SLEEP DISORDER: ICD-10-CM

## 2025-02-06 RX ORDER — ZOLPIDEM TARTRATE 5 MG/1
TABLET ORAL
Qty: 90 TABLET | Refills: 0 | Status: SHIPPED | OUTPATIENT
Start: 2025-02-06 | End: 2025-05-05

## 2025-02-06 NOTE — TELEPHONE ENCOUNTER
Chief Complaint   Patient presents with    Medication Refill     Last Appointment with Dr. Blane Ocampo:  12/23/2024   Future Appointments   Date Time Provider Department Center   6/9/2025  1:45 PM Blane Ocampo MD [facility] DEP

## 2025-03-16 LAB
ALBUMIN SERPL-MCNC: 4.4 G/DL (ref 3.8–4.9)
ALP SERPL-CCNC: 50 IU/L (ref 44–121)
ALT SERPL-CCNC: 21 IU/L (ref 0–32)
AST SERPL-CCNC: 24 IU/L (ref 0–40)
BILIRUB SERPL-MCNC: 0.5 MG/DL (ref 0–1.2)
BUN SERPL-MCNC: 16 MG/DL (ref 8–27)
BUN/CREAT SERPL: 19 (ref 12–28)
CALCIUM SERPL-MCNC: 9.5 MG/DL (ref 8.7–10.3)
CHLORIDE SERPL-SCNC: 104 MMOL/L (ref 96–106)
CHOLEST SERPL-MCNC: 288 MG/DL (ref 100–199)
CO2 SERPL-SCNC: 24 MMOL/L (ref 20–29)
CREAT SERPL-MCNC: 0.85 MG/DL (ref 0.57–1)
EGFRCR SERPLBLD CKD-EPI 2021: 78 ML/MIN/1.73
GLOBULIN SER CALC-MCNC: 2.8 G/DL (ref 1.5–4.5)
GLUCOSE SERPL-MCNC: 81 MG/DL (ref 70–99)
HDLC SERPL-MCNC: 88 MG/DL
LDLC SERPL CALC-MCNC: 180 MG/DL (ref 0–99)
POTASSIUM SERPL-SCNC: 4.5 MMOL/L (ref 3.5–5.2)
PROT SERPL-MCNC: 7.2 G/DL (ref 6–8.5)
SODIUM SERPL-SCNC: 142 MMOL/L (ref 134–144)
TRIGL SERPL-MCNC: 117 MG/DL (ref 0–149)
VLDLC SERPL CALC-MCNC: 20 MG/DL (ref 5–40)

## 2025-03-17 ENCOUNTER — RESULTS FOLLOW-UP (OUTPATIENT)
Age: 60
End: 2025-03-17

## 2025-07-15 ENCOUNTER — OFFICE VISIT (OUTPATIENT)
Age: 60
End: 2025-07-15
Payer: COMMERCIAL

## 2025-07-15 VITALS
WEIGHT: 134.6 LBS | TEMPERATURE: 99.5 F | SYSTOLIC BLOOD PRESSURE: 134 MMHG | HEIGHT: 63 IN | DIASTOLIC BLOOD PRESSURE: 79 MMHG | OXYGEN SATURATION: 96 % | RESPIRATION RATE: 16 BRPM | BODY MASS INDEX: 23.85 KG/M2 | HEART RATE: 83 BPM

## 2025-07-15 DIAGNOSIS — S39.012A STRAIN OF LUMBAR REGION, INITIAL ENCOUNTER: Primary | ICD-10-CM

## 2025-07-15 DIAGNOSIS — M62.830 BACK MUSCLE SPASM: ICD-10-CM

## 2025-07-15 DIAGNOSIS — Q18.1 CYST OF EAR CANAL: ICD-10-CM

## 2025-07-15 PROCEDURE — 99214 OFFICE O/P EST MOD 30 MIN: CPT | Performed by: INTERNAL MEDICINE

## 2025-07-15 RX ORDER — METHOCARBAMOL 500 MG/1
500 TABLET, FILM COATED ORAL 3 TIMES DAILY PRN
COMMUNITY
Start: 2025-07-15

## 2025-07-15 RX ORDER — METHOCARBAMOL 500 MG/1
500-1000 TABLET, FILM COATED ORAL 3 TIMES DAILY PRN
Qty: 60 TABLET | Refills: 1 | Status: SHIPPED | OUTPATIENT
Start: 2025-07-15 | End: 2025-08-24

## 2025-07-15 RX ORDER — METAXALONE 800 MG/1
TABLET ORAL
Qty: 30 TABLET | Refills: 2 | Status: SHIPPED | OUTPATIENT
Start: 2025-07-15

## 2025-07-15 SDOH — ECONOMIC STABILITY: FOOD INSECURITY: WITHIN THE PAST 12 MONTHS, THE FOOD YOU BOUGHT JUST DIDN'T LAST AND YOU DIDN'T HAVE MONEY TO GET MORE.: NEVER TRUE

## 2025-07-15 SDOH — ECONOMIC STABILITY: FOOD INSECURITY: WITHIN THE PAST 12 MONTHS, YOU WORRIED THAT YOUR FOOD WOULD RUN OUT BEFORE YOU GOT MONEY TO BUY MORE.: NEVER TRUE

## 2025-07-15 ASSESSMENT — PATIENT HEALTH QUESTIONNAIRE - PHQ9
SUM OF ALL RESPONSES TO PHQ QUESTIONS 1-9: 0
2. FEELING DOWN, DEPRESSED OR HOPELESS: NOT AT ALL
SUM OF ALL RESPONSES TO PHQ QUESTIONS 1-9: 0
1. LITTLE INTEREST OR PLEASURE IN DOING THINGS: NOT AT ALL

## 2025-07-15 NOTE — PROGRESS NOTES
HPI:  Alexa Young is a 60 y.o. year old female who is here for a 10-day history of pain across her lower back.  It seemed to have started over the weekend and has been intermittently sharp pain in the right lower back up between the shoulder blades.  She has been taking Skelaxin with some limited success.  The pain does not radiate to the legs.  There is no numbness, tingling, or weakness.  No change in bowel or bladder habits.  She has been trying to do some ice and heat with limited success.  She has also had a skin lesion on her left external ear from sleeping on her left side.  She saw the dermatologist who recommended a doughnut which does help intermittently.  She was also seen recently by her functional medicine doctor who noted a?  Cyst in the left ear canal.      Past Medical History:   Diagnosis Date    Allergic rhinitis     Atypical hyperplasia of breast 2006    Left    Breast atypical lobular hyperplasia     2006 left breast    Cancer (HCC)     skin    Hypothyroid     Unspecified hypothyroidism 12/6/2010       Past Surgical History:   Procedure Laterality Date    BIOPSY OF BREAST, NEEDLE CORE      times two    BREAST BIOPSY Left 2006    Excisional biopsy revealing ADH    PELVIC LAPAROSCOPY      UNS ORAL SURG PROC BY REPORT      WISDOM TOOTH EXTRACTION         Prior to Admission medications    Medication Sig Start Date End Date Taking? Authorizing Provider   methocarbamol (ROBAXIN) 500 MG tablet Take 1 tablet by mouth 3 times daily as needed (spasm) 7/15/25  Yes Blane Ocampo MD   methocarbamol (ROBAXIN) 500 MG tablet Take 1-2 tablets by mouth 3 times daily as needed (spasm) 7/15/25 8/24/25 Yes Blane Ocampo MD   metaxalone (SKELAXIN) 800 MG tablet TAKE 1/2-1 TABLET BY MOUTH 3 TIMES DAILY AS NEEDED FOR MUSCLE SPASMS 7/15/25  Yes Blane Ocampo MD   triamcinolone acetonide (KENALOG) 0.1 % paste Place onto teeth 2 times daily 12/23/24  Yes Blane Ocampo MD   citalopram (CELEXA) 20 MG

## 2025-07-27 RX ORDER — METHOCARBAMOL 500 MG/1
TABLET, FILM COATED ORAL
Qty: 60 TABLET | Refills: 1 | Status: SHIPPED | OUTPATIENT
Start: 2025-07-27

## 2025-08-08 ENCOUNTER — HOSPITAL ENCOUNTER (OUTPATIENT)
Facility: HOSPITAL | Age: 60
Setting detail: RECURRING SERIES
Discharge: HOME | End: 2025-08-11
Payer: COMMERCIAL

## 2025-08-08 PROCEDURE — 97140 MANUAL THERAPY 1/> REGIONS: CPT

## 2025-08-08 PROCEDURE — 97161 PT EVAL LOW COMPLEX 20 MIN: CPT

## 2025-08-12 ENCOUNTER — HOSPITAL ENCOUNTER (OUTPATIENT)
Facility: HOSPITAL | Age: 60
Setting detail: RECURRING SERIES
Discharge: HOME | End: 2025-08-15
Payer: COMMERCIAL

## 2025-08-12 PROCEDURE — 97110 THERAPEUTIC EXERCISES: CPT

## 2025-08-12 PROCEDURE — 97140 MANUAL THERAPY 1/> REGIONS: CPT

## 2025-08-14 ENCOUNTER — HOSPITAL ENCOUNTER (OUTPATIENT)
Facility: HOSPITAL | Age: 60
Setting detail: RECURRING SERIES
Discharge: HOME | End: 2025-08-17
Payer: COMMERCIAL

## 2025-08-14 PROCEDURE — 97140 MANUAL THERAPY 1/> REGIONS: CPT

## 2025-08-14 PROCEDURE — 97110 THERAPEUTIC EXERCISES: CPT

## 2025-08-19 ENCOUNTER — HOSPITAL ENCOUNTER (OUTPATIENT)
Facility: HOSPITAL | Age: 60
Setting detail: RECURRING SERIES
Discharge: HOME | End: 2025-08-22
Payer: COMMERCIAL

## 2025-08-19 PROCEDURE — 97110 THERAPEUTIC EXERCISES: CPT

## 2025-08-19 PROCEDURE — 97140 MANUAL THERAPY 1/> REGIONS: CPT

## 2025-08-21 ENCOUNTER — APPOINTMENT (OUTPATIENT)
Facility: HOSPITAL | Age: 60
End: 2025-08-21
Payer: COMMERCIAL

## 2025-08-26 ENCOUNTER — APPOINTMENT (OUTPATIENT)
Facility: HOSPITAL | Age: 60
End: 2025-08-26
Payer: COMMERCIAL

## 2025-08-28 ENCOUNTER — HOSPITAL ENCOUNTER (OUTPATIENT)
Facility: HOSPITAL | Age: 60
Setting detail: RECURRING SERIES
Discharge: HOME | End: 2025-08-31
Payer: COMMERCIAL

## 2025-08-28 PROCEDURE — 97110 THERAPEUTIC EXERCISES: CPT

## 2025-08-28 PROCEDURE — 97140 MANUAL THERAPY 1/> REGIONS: CPT

## 2025-09-03 ENCOUNTER — HOSPITAL ENCOUNTER (OUTPATIENT)
Facility: HOSPITAL | Age: 60
Setting detail: RECURRING SERIES
Discharge: HOME | End: 2025-09-06
Payer: COMMERCIAL

## 2025-09-03 PROCEDURE — 97110 THERAPEUTIC EXERCISES: CPT

## 2025-09-03 PROCEDURE — 97140 MANUAL THERAPY 1/> REGIONS: CPT
